# Patient Record
Sex: MALE | Race: WHITE | NOT HISPANIC OR LATINO | URBAN - METROPOLITAN AREA
[De-identification: names, ages, dates, MRNs, and addresses within clinical notes are randomized per-mention and may not be internally consistent; named-entity substitution may affect disease eponyms.]

---

## 2017-05-09 RX ADMIN — OXYCODONE HYDROCHLORIDE 10 MILLIGRAM(S): 5 TABLET ORAL at 11:51

## 2017-05-16 ENCOUNTER — INPATIENT (INPATIENT)
Facility: HOSPITAL | Age: 82
LOS: 2 days | Discharge: NURSING, REHAB OR LONG-TERM CARE | DRG: 181 | End: 2017-05-19
Attending: INTERNAL MEDICINE | Admitting: INTERNAL MEDICINE
Payer: MEDICARE

## 2017-05-16 VITALS
WEIGHT: 126.1 LBS | HEART RATE: 76 BPM | TEMPERATURE: 98 F | RESPIRATION RATE: 30 BRPM | OXYGEN SATURATION: 95 % | DIASTOLIC BLOOD PRESSURE: 88 MMHG | HEIGHT: 68 IN | SYSTOLIC BLOOD PRESSURE: 153 MMHG

## 2017-05-16 DIAGNOSIS — I35.0 NONRHEUMATIC AORTIC (VALVE) STENOSIS: ICD-10-CM

## 2017-05-16 DIAGNOSIS — J43.9 EMPHYSEMA, UNSPECIFIED: ICD-10-CM

## 2017-05-16 DIAGNOSIS — Z96.653 PRESENCE OF ARTIFICIAL KNEE JOINT, BILATERAL: Chronic | ICD-10-CM

## 2017-05-16 DIAGNOSIS — R64 CACHEXIA: ICD-10-CM

## 2017-05-16 DIAGNOSIS — R06.02 SHORTNESS OF BREATH: ICD-10-CM

## 2017-05-16 DIAGNOSIS — I25.10 ATHEROSCLEROTIC HEART DISEASE OF NATIVE CORONARY ARTERY WITHOUT ANGINA PECTORIS: ICD-10-CM

## 2017-05-16 DIAGNOSIS — T46.0X1A POISONING BY CARDIAC-STIMULANT GLYCOSIDES AND DRUGS OF SIMILAR ACTION, ACCIDENTAL (UNINTENTIONAL), INITIAL ENCOUNTER: ICD-10-CM

## 2017-05-16 DIAGNOSIS — Z95.0 PRESENCE OF CARDIAC PACEMAKER: ICD-10-CM

## 2017-05-16 DIAGNOSIS — I50.9 HEART FAILURE, UNSPECIFIED: ICD-10-CM

## 2017-05-16 DIAGNOSIS — D64.9 ANEMIA, UNSPECIFIED: ICD-10-CM

## 2017-05-16 DIAGNOSIS — I48.91 UNSPECIFIED ATRIAL FIBRILLATION: ICD-10-CM

## 2017-05-16 DIAGNOSIS — Z29.9 ENCOUNTER FOR PROPHYLACTIC MEASURES, UNSPECIFIED: ICD-10-CM

## 2017-05-16 DIAGNOSIS — I65.29 OCCLUSION AND STENOSIS OF UNSPECIFIED CAROTID ARTERY: ICD-10-CM

## 2017-05-16 DIAGNOSIS — R91.8 OTHER NONSPECIFIC ABNORMAL FINDING OF LUNG FIELD: ICD-10-CM

## 2017-05-16 LAB
ALBUMIN SERPL ELPH-MCNC: 2.9 G/DL — LOW (ref 3.3–5)
ALP SERPL-CCNC: 98 U/L — SIGNIFICANT CHANGE UP (ref 30–120)
ALT FLD-CCNC: 22 U/L DA — SIGNIFICANT CHANGE UP (ref 10–60)
ANION GAP SERPL CALC-SCNC: 11 MMOL/L — SIGNIFICANT CHANGE UP (ref 5–17)
AST SERPL-CCNC: 27 U/L — SIGNIFICANT CHANGE UP (ref 10–40)
BASOPHILS # BLD AUTO: 0.1 K/UL — SIGNIFICANT CHANGE UP (ref 0–0.2)
BASOPHILS NFR BLD AUTO: 0.5 % — SIGNIFICANT CHANGE UP (ref 0–2)
BILIRUB SERPL-MCNC: 0.5 MG/DL — SIGNIFICANT CHANGE UP (ref 0.2–1.2)
BUN SERPL-MCNC: 32 MG/DL — HIGH (ref 7–23)
CALCIUM SERPL-MCNC: 8.8 MG/DL — SIGNIFICANT CHANGE UP (ref 8.4–10.5)
CHLORIDE SERPL-SCNC: 104 MMOL/L — SIGNIFICANT CHANGE UP (ref 96–108)
CK MB BLD-MCNC: 2 % — SIGNIFICANT CHANGE UP (ref 0–3.5)
CK MB CFR SERPL CALC: 1.6 NG/ML — SIGNIFICANT CHANGE UP (ref 0–3.6)
CK SERPL-CCNC: 81 U/L — SIGNIFICANT CHANGE UP (ref 39–308)
CO2 SERPL-SCNC: 25 MMOL/L — SIGNIFICANT CHANGE UP (ref 22–31)
CREAT SERPL-MCNC: 0.83 MG/DL — SIGNIFICANT CHANGE UP (ref 0.5–1.3)
DIGOXIN SERPL-MCNC: 2.8 NG/ML — CRITICAL HIGH (ref 0.8–2)
EOSINOPHIL # BLD AUTO: 0.2 K/UL — SIGNIFICANT CHANGE UP (ref 0–0.5)
EOSINOPHIL NFR BLD AUTO: 1.5 % — SIGNIFICANT CHANGE UP (ref 0–6)
GLUCOSE SERPL-MCNC: 94 MG/DL — SIGNIFICANT CHANGE UP (ref 70–99)
HCT VFR BLD CALC: 33.8 % — LOW (ref 39–50)
HGB BLD-MCNC: 11.2 G/DL — LOW (ref 13–17)
LYMPHOCYTES # BLD AUTO: 0.8 K/UL — LOW (ref 1–3.3)
LYMPHOCYTES # BLD AUTO: 6.5 % — LOW (ref 13–44)
MCHC RBC-ENTMCNC: 30.2 PG — SIGNIFICANT CHANGE UP (ref 27–34)
MCHC RBC-ENTMCNC: 33.2 GM/DL — SIGNIFICANT CHANGE UP (ref 32–36)
MCV RBC AUTO: 90.9 FL — SIGNIFICANT CHANGE UP (ref 80–100)
MONOCYTES # BLD AUTO: 0.7 K/UL — SIGNIFICANT CHANGE UP (ref 0–0.9)
MONOCYTES NFR BLD AUTO: 6.1 % — SIGNIFICANT CHANGE UP (ref 2–14)
NEUTROPHILS # BLD AUTO: 10 K/UL — HIGH (ref 1.8–7.4)
NEUTROPHILS NFR BLD AUTO: 85.4 % — HIGH (ref 43–77)
NT-PROBNP SERPL-SCNC: 5197 PG/ML — HIGH (ref 0–450)
PLATELET # BLD AUTO: 265 K/UL — SIGNIFICANT CHANGE UP (ref 150–400)
POTASSIUM SERPL-MCNC: 3.7 MMOL/L — SIGNIFICANT CHANGE UP (ref 3.5–5.3)
POTASSIUM SERPL-SCNC: 3.7 MMOL/L — SIGNIFICANT CHANGE UP (ref 3.5–5.3)
PROT SERPL-MCNC: 6.8 G/DL — SIGNIFICANT CHANGE UP (ref 6–8.3)
RBC # BLD: 3.72 M/UL — LOW (ref 4.2–5.8)
RBC # FLD: 14 % — SIGNIFICANT CHANGE UP (ref 10.3–14.5)
SODIUM SERPL-SCNC: 140 MMOL/L — SIGNIFICANT CHANGE UP (ref 135–145)
TROPONIN I SERPL-MCNC: 0.07 NG/ML — HIGH (ref 0.02–0.06)
TROPONIN I SERPL-MCNC: 0.07 NG/ML — HIGH (ref 0.02–0.06)
WBC # BLD: 11.7 K/UL — HIGH (ref 3.8–10.5)
WBC # FLD AUTO: 11.7 K/UL — HIGH (ref 3.8–10.5)

## 2017-05-16 PROCEDURE — 93010 ELECTROCARDIOGRAM REPORT: CPT

## 2017-05-16 PROCEDURE — 71010: CPT | Mod: 26

## 2017-05-16 PROCEDURE — 93306 TTE W/DOPPLER COMPLETE: CPT | Mod: 26

## 2017-05-16 PROCEDURE — 99223 1ST HOSP IP/OBS HIGH 75: CPT | Mod: AI

## 2017-05-16 PROCEDURE — 99285 EMERGENCY DEPT VISIT HI MDM: CPT

## 2017-05-16 PROCEDURE — 99223 1ST HOSP IP/OBS HIGH 75: CPT | Mod: 25

## 2017-05-16 PROCEDURE — 71250 CT THORAX DX C-: CPT | Mod: 26

## 2017-05-16 RX ORDER — SIMVASTATIN 20 MG/1
40 TABLET, FILM COATED ORAL AT BEDTIME
Qty: 0 | Refills: 0 | Status: DISCONTINUED | OUTPATIENT
Start: 2017-05-16 | End: 2017-05-19

## 2017-05-16 RX ORDER — DOXAZOSIN MESYLATE 4 MG
4 TABLET ORAL AT BEDTIME
Qty: 0 | Refills: 0 | Status: DISCONTINUED | OUTPATIENT
Start: 2017-05-16 | End: 2017-05-19

## 2017-05-16 RX ORDER — IPRATROPIUM/ALBUTEROL SULFATE 18-103MCG
3 AEROSOL WITH ADAPTER (GRAM) INHALATION EVERY 6 HOURS
Qty: 0 | Refills: 0 | Status: DISCONTINUED | OUTPATIENT
Start: 2017-05-16 | End: 2017-05-16

## 2017-05-16 RX ORDER — SODIUM CHLORIDE 9 MG/ML
3 INJECTION INTRAMUSCULAR; INTRAVENOUS; SUBCUTANEOUS ONCE
Qty: 0 | Refills: 0 | Status: COMPLETED | OUTPATIENT
Start: 2017-05-16 | End: 2017-05-16

## 2017-05-16 RX ORDER — OXYCODONE HYDROCHLORIDE 5 MG/1
5 TABLET ORAL EVERY 4 HOURS
Qty: 0 | Refills: 0 | Status: DISCONTINUED | OUTPATIENT
Start: 2017-05-16 | End: 2017-05-19

## 2017-05-16 RX ORDER — OXYCODONE HYDROCHLORIDE 5 MG/1
10 TABLET ORAL EVERY 4 HOURS
Qty: 0 | Refills: 0 | Status: DISCONTINUED | OUTPATIENT
Start: 2017-05-16 | End: 2017-05-19

## 2017-05-16 RX ORDER — FERROUS SULFATE 325(65) MG
325 TABLET ORAL DAILY
Qty: 0 | Refills: 0 | Status: DISCONTINUED | OUTPATIENT
Start: 2017-05-16 | End: 2017-05-19

## 2017-05-16 RX ORDER — IPRATROPIUM/ALBUTEROL SULFATE 18-103MCG
3 AEROSOL WITH ADAPTER (GRAM) INHALATION EVERY 6 HOURS
Qty: 0 | Refills: 0 | Status: DISCONTINUED | OUTPATIENT
Start: 2017-05-16 | End: 2017-05-19

## 2017-05-16 RX ORDER — FUROSEMIDE 40 MG
20 TABLET ORAL ONCE
Qty: 0 | Refills: 0 | Status: COMPLETED | OUTPATIENT
Start: 2017-05-16 | End: 2017-05-16

## 2017-05-16 RX ORDER — METOPROLOL TARTRATE 50 MG
25 TABLET ORAL
Qty: 0 | Refills: 0 | Status: DISCONTINUED | OUTPATIENT
Start: 2017-05-16 | End: 2017-05-19

## 2017-05-16 RX ORDER — ASPIRIN/CALCIUM CARB/MAGNESIUM 324 MG
81 TABLET ORAL DAILY
Qty: 0 | Refills: 0 | Status: DISCONTINUED | OUTPATIENT
Start: 2017-05-16 | End: 2017-05-19

## 2017-05-16 RX ORDER — HEPARIN SODIUM 5000 [USP'U]/ML
5000 INJECTION INTRAVENOUS; SUBCUTANEOUS EVERY 12 HOURS
Qty: 0 | Refills: 0 | Status: DISCONTINUED | OUTPATIENT
Start: 2017-05-16 | End: 2017-05-19

## 2017-05-16 RX ORDER — DILTIAZEM HCL 120 MG
1 CAPSULE, EXT RELEASE 24 HR ORAL
Qty: 0 | Refills: 0 | COMMUNITY

## 2017-05-16 RX ORDER — DILTIAZEM HCL 120 MG
180 CAPSULE, EXT RELEASE 24 HR ORAL DAILY
Qty: 0 | Refills: 0 | Status: DISCONTINUED | OUTPATIENT
Start: 2017-05-16 | End: 2017-05-19

## 2017-05-16 RX ORDER — BUDESONIDE, MICRONIZED 100 %
0.5 POWDER (GRAM) MISCELLANEOUS
Qty: 0 | Refills: 0 | Status: DISCONTINUED | OUTPATIENT
Start: 2017-05-16 | End: 2017-05-19

## 2017-05-16 RX ORDER — OXYCODONE HYDROCHLORIDE 5 MG/1
40 TABLET ORAL EVERY 8 HOURS
Qty: 0 | Refills: 0 | Status: DISCONTINUED | OUTPATIENT
Start: 2017-05-16 | End: 2017-05-19

## 2017-05-16 RX ADMIN — SIMVASTATIN 40 MILLIGRAM(S): 20 TABLET, FILM COATED ORAL at 23:09

## 2017-05-16 RX ADMIN — OXYCODONE HYDROCHLORIDE 10 MILLIGRAM(S): 5 TABLET ORAL at 18:10

## 2017-05-16 RX ADMIN — Medication 4 MILLIGRAM(S): at 23:09

## 2017-05-16 RX ADMIN — SODIUM CHLORIDE 3 MILLILITER(S): 9 INJECTION INTRAMUSCULAR; INTRAVENOUS; SUBCUTANEOUS at 12:30

## 2017-05-16 RX ADMIN — Medication 3 MILLILITER(S): at 13:24

## 2017-05-16 RX ADMIN — Medication 40 MILLIGRAM(S): at 23:09

## 2017-05-16 RX ADMIN — HEPARIN SODIUM 5000 UNIT(S): 5000 INJECTION INTRAVENOUS; SUBCUTANEOUS at 18:10

## 2017-05-16 RX ADMIN — Medication 20 MILLIGRAM(S): at 14:48

## 2017-05-16 RX ADMIN — Medication 0.5 MILLIGRAM(S): at 19:36

## 2017-05-16 RX ADMIN — Medication 25 MILLIGRAM(S): at 18:10

## 2017-05-16 RX ADMIN — OXYCODONE HYDROCHLORIDE 40 MILLIGRAM(S): 5 TABLET ORAL at 22:00

## 2017-05-16 RX ADMIN — OXYCODONE HYDROCHLORIDE 40 MILLIGRAM(S): 5 TABLET ORAL at 23:09

## 2017-05-16 RX ADMIN — Medication 3 MILLILITER(S): at 19:36

## 2017-05-16 NOTE — CONSULT NOTE ADULT - PROBLEM SELECTOR RECOMMENDATION 2
Aortic stenosis was mild in 2014 - loud murmur on examination; repeat echo to assess for interval progression.

## 2017-05-16 NOTE — H&P ADULT - NSHPPHYSICALEXAM_GEN_ALL_CORE
PHYSICAL EXAM:  Vital Signs Last 24 Hrs  T(C): 36.6, Max: 36.6 (05-16 @ 12:06)  T(F): 97.8, Max: 97.8 (05-16 @ 12:06)  HR: 73 (60 - 76)  BP: 148/76 (138/82 - 155/66)  BP(mean): --  RR: 23 (22 - 30)  SpO2: 98% (95% - 98%)    GENERAL: NAD, cachectic   HEAD:  Atraumatic, Normocephalic  EYES: conjunctiva and sclera clear  ENMT: No tonsillar erythema, exudates, or enlargement; Moist mucous membranes  NECK: Supple  NERVOUS SYSTEM:  Alert & Oriented X2, Good atttention; moving all 4 extremities - able to get back into bed fromt he chair but reports feeling weak, no gross sensory loss;   CHEST/LUNG: Clear to auscultation bilaterally; decreased air entry no overt wheezing  HEART: Regular rate and rhythm;   ABDOMEN: Soft, Nontender, Nondistended; Bowel sounds present  EXTREMITIES:  2+ Peripheral Pulses, No clubbing, cyanosis, or edema  SKIN: multiple psoriatic lesions

## 2017-05-16 NOTE — CONSULT NOTE ADULT - PROBLEM SELECTOR PROBLEM 4
Digoxin overdose, accidental or unintentional, initial encounter
COPD (chronic obstructive pulmonary disease) with emphysema

## 2017-05-16 NOTE — H&P ADULT - PROBLEM SELECTOR PLAN 3
Cardiology eval appreciated  Echo done - awaiting official read  Lasix given by ED - will re-evaluate need for diuresis in AM  PPM eval tomorrow.   Med management per cardiology

## 2017-05-16 NOTE — H&P ADULT - ASSESSMENT
86M with multiple chronic medical issues presents with worsening dyspnea, no clear alleviating or aggravating factors other than worse with ambulation.  BNP elevated but unclear if symptoms are due to CHF.  Symptoms may be due to untreated COPD or lung mass found on CT.

## 2017-05-16 NOTE — H&P ADULT - PROBLEM SELECTOR PLAN 2
SOB may be due to acute COPD exacerbation   Solumedrol ordered  inhalers ordered - pulmicort/ duoneb  Pulmonary eval - Dr Cleary

## 2017-05-16 NOTE — H&P ADULT - NSHPLABSRESULTS_GEN_ALL_CORE
11.2   11.7  )-----------( 265      ( 16 May 2017 12:46 )             33.8       05-16    140  |  104  |  32<H>  ----------------------------<  94  3.7   |  25  |  0.83    Ca    8.8      16 May 2017 12:46    TPro  6.8  /  Alb  2.9<L>  /  TBili  0.5  /  DBili  x   /  AST  27  /  ALT  22  /  AlkPhos  98  05-16          CARDIAC MARKERS ( 16 May 2017 12:46 )  .065 ng/mL / x     / 81 U/L / x     / 1.6 ng/mL    Serum Pro-Brain Natriuretic Peptide (05.16.17 @ 12:46)      Serum Pro-Brain Natriuretic Peptide: 5197 pg/mL    Digoxin Level, Serum: 2.8:    Xray:    A soft tissue mass is present in the left upper lung measuring 6 cm   maximum span. Findings suspicious for the presence of a neoplasm. The   right hemidiaphragm is flattened, an emphysematous change. No effusion.   No vascular congestion.     CT chest:    mass is present in the left upper lobe measuring 65 mm vertical height,   71 mm in depth, 47 mm in width. This mass extends to the pleural surface   and to the left suprahilar region. The appearance is consistent with the   presence of a  neoplasm. There are multiple nodes in the mediastinum   which although are in the subcentimeter  size range must be considered   suspicious for the presence of metastatic disease.    Small bibasilar pleural effusions are present. Small pericardial effusion   is present. No evidence of thoracic aortic aneurysm.    A diffuse groundglass appearance is identified in the right lung.   Scattered groundglass appearance also present in the left lung.    The central airway appears intact. Thyroid gland is not enlarged. A   cardiac pacemaker is present with generator localized to the soft tissues   of the left anterior upper thorax.    Prominent appearance of adrenal glands without focal mass. No acute   osseous abnormalities are identified.

## 2017-05-16 NOTE — ED ADULT NURSE NOTE - CHPI ED SYMPTOMS POS
SHORTNESS OF BREATH/DIFFICULTY BREATHING DYSPNEA ON EXERTION/SHORTNESS OF BREATH/DIFFICULTY BREATHING

## 2017-05-16 NOTE — CONSULT NOTE ADULT - PROBLEM SELECTOR RECOMMENDATION 9
Elevated proBNP but no signs of pulmonary edema / volume overload; I suspect SOB is related to COPD, possible progression of known valve disease, lung mass.  I recommend pulmonary consultation - he has seen Dr. Cleary in the past.

## 2017-05-16 NOTE — ED PROVIDER NOTE - CARE PLAN
Principal Discharge DX:	COPD (chronic obstructive pulmonary disease) with emphysema  Secondary Diagnosis:	CHF (congestive heart failure)

## 2017-05-16 NOTE — CONSULT NOTE ADULT - PROBLEM SELECTOR RECOMMENDATION 4
Elevated serum digoxin level on presentation; hold digoxin; no signs of toxicity.
Inhalers  Steroids

## 2017-05-16 NOTE — ED ADULT NURSE NOTE - CHPI ED SYMPTOMS NEG
no edema/no hemoptysis/no cough/no chills/no headache/no diaphoresis/no chest pain/no fever no edema/no wheezing/no body aches/no chills/no chest pain/no diaphoresis/no hemoptysis/no fever/no headache/no cough

## 2017-05-16 NOTE — ED ADULT NURSE NOTE - PMH
Anemia    Asthma, Chronic    Atrial Fibrillation    Bacterial Endocarditis    BPH (Benign Prostatic Hypertrophy)    CAD (coronary artery disease)    Cardiac Pacemaker    Carotid stenosis    COPD (Chronic Obstructive Pulmonary Disease)    Diverticulitis    GIB (gastrointestinal bleeding)    HTN (Hypertension)    Polymicrobial Sepsis    Renal Failure, Chronic

## 2017-05-16 NOTE — CONSULT NOTE ADULT - ASSESSMENT
Elderly noncompliant pt. with MARILIA mass--most likely metastatic lung cancer.  COPD with recent exacerbation.

## 2017-05-16 NOTE — ED ADULT NURSE NOTE - OBJECTIVE STATEMENT
Pt states sob for past 2-3 nights.  Has appt this friday with LUCILLE GUZMAN. Pt states sob for past 2-3 nights.  [facility]. Pt c/o worsening SOB and weakness with exertion for 3 days. Does not use O2 at home; state some improvement with O2 at this time.  Denies fevers, chest pain, cough, abdominal pain, nausea, or vomiting.

## 2017-05-16 NOTE — H&P ADULT - NSHPREVIEWOFSYSTEMS_GEN_ALL_CORE
REVIEW OF SYSTEMS:  CONSTITUTIONAL: + weight loss, somewhat intentional.   EYES: No eye pain, visual disturbances  ENMT:  No sinus or throat pain  BREASTS: No pain, masses, or nipple discharge  RESPIRATORY: see hpi  CARDIOVASCULAR: No chest pain, palpitations, dizziness, or leg swelling  GASTROINTESTINAL: No abdominal or epigastric pain. No nausea, vomiting, or hematemesis; No diarrhea or constipation. No melena or hematochezia.  GENITOURINARY: + chronic urinary frequency.  His kinesiologist does exercises with him to "raise his bladder" that have been helping.   NEUROLOGICAL: + worsening weakness and difficulty ambulating.   SKIN: +psoriatic lesions.  ENDOCRINE: daughter reports frequent urination at home  MUSCULOSKELETAL: +chronic knee and back pain.   PSYCHIATRIC: forgetful at times.

## 2017-05-16 NOTE — H&P ADULT - PROBLEM SELECTOR PLAN 6
likely due to dietary changes.  carbohydrate malnutrition.  may also be secondary to malignancy.  d/w daughter that he needs carbs in his diet even if not bread.  encourage po intake, add ensure,

## 2017-05-16 NOTE — ED ADULT NURSE NOTE - PRIMARY CARE PROVIDER
[facility]  and MD reid [place] [facility]  and MD across [place] from [facility] Dr. Beckford

## 2017-05-16 NOTE — CONSULT NOTE ADULT - PROBLEM SELECTOR PROBLEM 3
Coronary artery disease involving native coronary artery of native heart without angina pectoris
Coronary artery disease involving native coronary artery of native heart without angina pectoris

## 2017-05-16 NOTE — ED PROVIDER NOTE - DETAILS:
Brenton Phipps MD - The scribe's documentation has been prepared under my direction and personally reviewed by me in its entirety. I confirm that the note above accurately reflects all work, treatment, procedures, and medical decision making performed by me.

## 2017-05-16 NOTE — ED ADULT NURSE REASSESSMENT NOTE - NS ED NURSE REASSESS COMMENT FT1
phoned ekg to do ekg, phoned resp for duo neb treatment
Dr. Somers placing orders, daughter at bedside.

## 2017-05-16 NOTE — H&P ADULT - PROBLEM SELECTOR PLAN 5
continue aspirin   med management per cardiology continue aspirin   med management per cardiology    Rate controlled afib  Dig level elevated - will hold and repeat level in am

## 2017-05-16 NOTE — CONSULT NOTE ADULT - SUBJECTIVE AND OBJECTIVE BOX
REQUESTING PHYSICIAN: Dr. AMELIE Beard    REASON FOR CONSULT: Asked to see patient for SOB     CHIEF COMPLAINT: SOB x 2 days    HPI:  86 year old man with a history of CAD s/p PCI (LAD 2012, RCA 2010), atrial fibrillation, PPM, COPD, GI bleed (recurrent), carotid artery disease, HTN, HLD, aortic stenosis (mild in 2014), who presents to the ED with complaint of dyspnea.  Symptoms started 2 days ago - some worsening over past 48 hours prompted him to come to the ER.  He is unable to identify exacerbating or alleviating factors; no associated orthopnea, cough, hemoptysis sputum production.  He has has little traditional medical care in several years; last visit with his cardiologist (Dr. Palla) was in 2014; seeking alternative medicine.    PAST MEDICAL & SURGICAL HISTORY:  Carotid stenosis  GIB (gastrointestinal bleeding)  CAD (coronary artery disease)  Diverticulitis  Anemia  Polymicrobial Sepsis  Bacterial Endocarditis  Cardiac Pacemaker  COPD (Chronic Obstructive Pulmonary Disease)  Atrial Fibrillation  BPH (Benign Prostatic Hypertrophy)  Asthma, Chronic  HTN (Hypertension)  Renal Failure, Chronic  Renal Failure, Chronic  Pacemaker  CAD (Coronary Artery Disease)    SOCIAL HISTORY:   Alcohol: Denied  Smoking: Nonsmoker    FAMILY HISTORY:   No pertinent family history    MEDICATIONS:  * Patient unable to recall current meds.  In May 2014, he was prescribed digoxin, diltiazem, flomax, singulair, zocor    Allergies:   clindamycin (Unknown)  GoLYTELY (Unknown)  Levaquin (Pruritus; Rash)  P O Contrast (Unknown)  penicillin (Rash)      REVIEW OF SYSTEMS:  CONSTITUTIONAL: + weakness (especially legs with ambulation), no fevers or chills  EYES/ENT: No visual changes;  No vertigo or throat pain   NECK: No pain or stiffness  RESPIRATORY: No cough, wheezing, hemoptysis; + shortness of breath  CARDIOVASCULAR: No chest pain or palpitations  GASTROINTESTINAL: No abdominal pain. No nausea, vomiting, or hematemesis; No diarrhea or constipation. No melena or hematochezia.  GENITOURINARY: No dysuria, frequency or hematuria  NEUROLOGICAL: No numbness or weakness  SKIN: No itching or rash  All other review of systems is negative unless indicated above    VITAL SIGNS:   Vital Signs Last 24 Hrs  T(C): 36.6, Max: 36.6 (05-16 @ 12:06)  T(F): 97.8, Max: 97.8 (05-16 @ 12:06)  HR: 61 (60 - 76)  BP: 138/82 (138/82 - 155/66)  RR: 22 (22 - 30)  SpO2: 95% (95% - 97%)    PHYSICAL EXAM:  Constitutional: Appears stated age, supine/flat in bed and in no distress  Eyes:  EOMI,  Pupils round, No oral cyanosis.  Pulmonary: Non-labored, breath sounds are clear bilaterally, No wheezing, rales or rhonchi  Cardiovascular: S1 and S2, regular rate and rhythm, III/VI systolic murmur at base radiating to carotid  Gastrointestinal: Bowel Sounds present, soft, nontender.   Lymph: No peripheral edema. No cervical lymphadenopathy.  Neurological: Alert, no focal deficits  Skin: Warm, dry.  Psych:  Appears somewhat withdrawn; otherwise mood & affect appropriate    LABS:                        11.2   11.7  )-----------( 265      ( 16 May 2017 12:46 )             33.8     140    |  104    |  32     ----------------------------<  94     3.7     |  25     |  0.83     Ca    8.8        16 May 2017 12:46  TPro  6.8    /  Alb  2.9    /  TBili  0.5    /  DBili  x      /  AST  27     /  ALT  22     /  AlkPhos  98     16 May 2017 12:46    CARDIAC MARKERS ( 16 May 2017 12:46 ) .065 ng/mL / x     / 81 U/L / x     / 1.6 ng/mL    05-16 @ 12:46 Pro Bnp [id number]    CXR: A soft tissue mass is present in the left upper lung measuring 6 cm maximum span. Findings suspicious for the presence of a neoplasm. The right hemidiaphragm is flattened, an emphysematous change. No effusion. No vascular congestion.     ECG: AF, PVC, electronic ventricular pacing

## 2017-05-16 NOTE — H&P ADULT - HISTORY OF PRESENT ILLNESS
86 years old male with history CAD, Cardiomyopathy, A Fib, Anemia, pneumonia, CKD, Diverticulosis, GI bleed, BPH, COPD, chronic pain on opioids, presents with 2 day history of SOB.  Daughter reports he is mostly sedentary due to chronic knee pain despite bilateral knee replacements and chronic opioid use.  He reports feeling SOB at rest and increasing with movement but denies cough, fever, chest pain, palpitations. 86 years old male with history CAD, Cardiomyopathy, A Fib, Anemia, pneumonia, CKD, Diverticulosis, GI bleed, BPH, COPD, chronic pain on opioids, presents with 2 day history of SOB.  Daughter reports he is mostly sedentary due to chronic knee pain despite bilateral knee replacements and chronic opioid use.  He reports feeling SOB at rest and increasing with movement but denies cough, fever, chills, chest pain, palpitations.  Aside from weak knees and chronic back pain he denies other symptoms.  He denies changes in medication or diet.  His kinesiologist has him on a low carb, no yeast diet and he has lost >100 lbs on this diet.  The patient and daughter both say that he has been eating his proteins and vegetables.  Patient and daughter [facility] but hasn't seen a cardiologist there.  Last check of his pacemaker was 3 years ago in Dr Palla's office.  He also stopped seeing Dr Cleary since "he wasn't having any breathing issues".  The daughter remembers being told he needs a PET scan, but she thought it was for something in his carotid and did not want him getting any invasive dyes since that might be bad for him.  She reports patient did not smoke but that other people smoke in the house but not in his room.  Both parents  of old age in their 90s.

## 2017-05-16 NOTE — CONSULT NOTE ADULT - PROBLEM SELECTOR RECOMMENDATION 3
CAD s/p remote PCI of LAD and RCA; symptoms not suggestive of typical angina but may need ischemia evaluation depending on hospital course.

## 2017-05-16 NOTE — H&P ADULT - ATTENDING COMMENTS
imaging personally reviewed, reports d/w dr barrera and dr reina  d/w Dr mcnulty need for echo, ppm interogation  d/w daughter need for biopsy and possibility of malignancy

## 2017-05-16 NOTE — PROVIDER CONTACT NOTE (CRITICAL VALUE NOTIFICATION) - ACTION/TREATMENT ORDERED:
MD aware, no further orders at this time.
1906: Dr. Hilliard aware and states to cancel any future Troponin orders.

## 2017-05-17 ENCOUNTER — RESULT REVIEW (OUTPATIENT)
Age: 82
End: 2017-05-17

## 2017-05-17 DIAGNOSIS — I05.9 RHEUMATIC MITRAL VALVE DISEASE, UNSPECIFIED: ICD-10-CM

## 2017-05-17 DIAGNOSIS — I35.9 NONRHEUMATIC AORTIC VALVE DISORDER, UNSPECIFIED: ICD-10-CM

## 2017-05-17 DIAGNOSIS — I27.2 OTHER SECONDARY PULMONARY HYPERTENSION: ICD-10-CM

## 2017-05-17 LAB
ANION GAP SERPL CALC-SCNC: 9 MMOL/L — SIGNIFICANT CHANGE UP (ref 5–17)
BASOPHILS # BLD AUTO: 0 K/UL — SIGNIFICANT CHANGE UP (ref 0–0.2)
BASOPHILS NFR BLD AUTO: 0.3 % — SIGNIFICANT CHANGE UP (ref 0–2)
BUN SERPL-MCNC: 32 MG/DL — HIGH (ref 7–23)
CALCIUM SERPL-MCNC: 8.8 MG/DL — SIGNIFICANT CHANGE UP (ref 8.4–10.5)
CHLORIDE SERPL-SCNC: 104 MMOL/L — SIGNIFICANT CHANGE UP (ref 96–108)
CO2 SERPL-SCNC: 28 MMOL/L — SIGNIFICANT CHANGE UP (ref 22–31)
CREAT SERPL-MCNC: 0.86 MG/DL — SIGNIFICANT CHANGE UP (ref 0.5–1.3)
DIGOXIN SERPL-MCNC: 2.3 NG/ML — HIGH (ref 0.8–2)
EOSINOPHIL # BLD AUTO: 0 K/UL — SIGNIFICANT CHANGE UP (ref 0–0.5)
EOSINOPHIL NFR BLD AUTO: 0.3 % — SIGNIFICANT CHANGE UP (ref 0–6)
GLUCOSE SERPL-MCNC: 122 MG/DL — HIGH (ref 70–99)
HCT VFR BLD CALC: 32.6 % — LOW (ref 39–50)
HGB BLD-MCNC: 11.1 G/DL — LOW (ref 13–17)
INR BLD: 1.05 RATIO — SIGNIFICANT CHANGE UP (ref 0.88–1.16)
LYMPHOCYTES # BLD AUTO: 0.5 K/UL — LOW (ref 1–3.3)
LYMPHOCYTES # BLD AUTO: 6.8 % — LOW (ref 13–44)
MCHC RBC-ENTMCNC: 30.4 PG — SIGNIFICANT CHANGE UP (ref 27–34)
MCHC RBC-ENTMCNC: 34.1 GM/DL — SIGNIFICANT CHANGE UP (ref 32–36)
MCV RBC AUTO: 89 FL — SIGNIFICANT CHANGE UP (ref 80–100)
MONOCYTES # BLD AUTO: 0.1 K/UL — SIGNIFICANT CHANGE UP (ref 0–0.9)
MONOCYTES NFR BLD AUTO: 1.6 % — LOW (ref 2–14)
NEUTROPHILS # BLD AUTO: 6.5 K/UL — SIGNIFICANT CHANGE UP (ref 1.8–7.4)
NEUTROPHILS NFR BLD AUTO: 91 % — HIGH (ref 43–77)
PLATELET # BLD AUTO: 252 K/UL — SIGNIFICANT CHANGE UP (ref 150–400)
POTASSIUM SERPL-MCNC: 3.6 MMOL/L — SIGNIFICANT CHANGE UP (ref 3.5–5.3)
POTASSIUM SERPL-SCNC: 3.6 MMOL/L — SIGNIFICANT CHANGE UP (ref 3.5–5.3)
PROTHROM AB SERPL-ACNC: 11.5 SEC — SIGNIFICANT CHANGE UP (ref 9.8–12.7)
RBC # BLD: 3.67 M/UL — LOW (ref 4.2–5.8)
RBC # FLD: 14.4 % — SIGNIFICANT CHANGE UP (ref 10.3–14.5)
SODIUM SERPL-SCNC: 141 MMOL/L — SIGNIFICANT CHANGE UP (ref 135–145)
TROPONIN I SERPL-MCNC: 0.04 NG/ML — SIGNIFICANT CHANGE UP (ref 0.02–0.06)
WBC # BLD: 7.1 K/UL — SIGNIFICANT CHANGE UP (ref 3.8–10.5)
WBC # FLD AUTO: 7.1 K/UL — SIGNIFICANT CHANGE UP (ref 3.8–10.5)

## 2017-05-17 PROCEDURE — 71010: CPT | Mod: 26

## 2017-05-17 PROCEDURE — 71010: CPT | Mod: 26,77

## 2017-05-17 PROCEDURE — 32405: CPT

## 2017-05-17 PROCEDURE — 88305 TISSUE EXAM BY PATHOLOGIST: CPT | Mod: 26

## 2017-05-17 PROCEDURE — 99233 SBSQ HOSP IP/OBS HIGH 50: CPT

## 2017-05-17 PROCEDURE — 77012 CT SCAN FOR NEEDLE BIOPSY: CPT | Mod: 26

## 2017-05-17 RX ORDER — ALPRAZOLAM 0.25 MG
0.25 TABLET ORAL ONCE
Qty: 0 | Refills: 0 | Status: DISCONTINUED | OUTPATIENT
Start: 2017-05-17 | End: 2017-05-17

## 2017-05-17 RX ORDER — FUROSEMIDE 40 MG
20 TABLET ORAL DAILY
Qty: 0 | Refills: 0 | Status: DISCONTINUED | OUTPATIENT
Start: 2017-05-17 | End: 2017-05-19

## 2017-05-17 RX ADMIN — OXYCODONE HYDROCHLORIDE 40 MILLIGRAM(S): 5 TABLET ORAL at 18:00

## 2017-05-17 RX ADMIN — Medication 25 MILLIGRAM(S): at 17:01

## 2017-05-17 RX ADMIN — OXYCODONE HYDROCHLORIDE 40 MILLIGRAM(S): 5 TABLET ORAL at 17:01

## 2017-05-17 RX ADMIN — HEPARIN SODIUM 5000 UNIT(S): 5000 INJECTION INTRAVENOUS; SUBCUTANEOUS at 17:00

## 2017-05-17 RX ADMIN — Medication 325 MILLIGRAM(S): at 11:35

## 2017-05-17 RX ADMIN — Medication 20 MILLIGRAM(S): at 11:35

## 2017-05-17 RX ADMIN — Medication 0.25 MILLIGRAM(S): at 12:24

## 2017-05-17 RX ADMIN — Medication 1 APPLICATION(S): at 05:28

## 2017-05-17 RX ADMIN — Medication 81 MILLIGRAM(S): at 11:35

## 2017-05-17 RX ADMIN — Medication 3 MILLILITER(S): at 12:49

## 2017-05-17 RX ADMIN — Medication 40 MILLIGRAM(S): at 16:59

## 2017-05-17 RX ADMIN — HEPARIN SODIUM 5000 UNIT(S): 5000 INJECTION INTRAVENOUS; SUBCUTANEOUS at 05:27

## 2017-05-17 RX ADMIN — Medication 3 MILLILITER(S): at 07:15

## 2017-05-17 RX ADMIN — Medication 40 MILLIGRAM(S): at 05:28

## 2017-05-17 RX ADMIN — OXYCODONE HYDROCHLORIDE 40 MILLIGRAM(S): 5 TABLET ORAL at 22:39

## 2017-05-17 RX ADMIN — OXYCODONE HYDROCHLORIDE 40 MILLIGRAM(S): 5 TABLET ORAL at 05:27

## 2017-05-17 RX ADMIN — OXYCODONE HYDROCHLORIDE 40 MILLIGRAM(S): 5 TABLET ORAL at 21:38

## 2017-05-17 RX ADMIN — Medication 4 MILLIGRAM(S): at 21:39

## 2017-05-17 RX ADMIN — Medication 0.5 MILLIGRAM(S): at 19:58

## 2017-05-17 RX ADMIN — Medication 25 MILLIGRAM(S): at 05:28

## 2017-05-17 RX ADMIN — Medication 3 MILLILITER(S): at 19:58

## 2017-05-17 RX ADMIN — SIMVASTATIN 40 MILLIGRAM(S): 20 TABLET, FILM COATED ORAL at 21:38

## 2017-05-17 RX ADMIN — Medication 180 MILLIGRAM(S): at 05:27

## 2017-05-17 RX ADMIN — Medication 1 APPLICATION(S): at 17:00

## 2017-05-17 RX ADMIN — Medication 40 MILLIGRAM(S): at 21:38

## 2017-05-17 RX ADMIN — Medication 0.5 MILLIGRAM(S): at 07:15

## 2017-05-17 NOTE — DIETITIAN INITIAL EVALUATION ADULT. - ETIOLOGY
physiological causes increasing nutrient needs desire for a cure for chronic disease through use of alternative therapy

## 2017-05-17 NOTE — DIETITIAN INITIAL EVALUATION ADULT. - OTHER INFO
Afib,CAD, chronic pain, pna, CKD, diverticulitis, anemia, COPD  Pt last saw pulmonary MD in 2014 when MARILIA nodule was discovered but pt refused PET scan. Pt has been seeing only  a kinesiologist since. He states he was told not to eat carbohydrates or yeast but could not recall reason why-just that kinesiologist told him not to. Pt reports 100 # wt loss over a year that was unintentional  Pt reports eating a balanced diet currently in hospital. RN confirms this. He denies any trouble chewing/swallowing and/or GI issues. Educated pt on risks of low to no carbohydrate diet. Pt verbalizes understanding. Per MD note pt likely has lung cancer with mets which would account for the weight loss. biopsy is pending. Noted pt admitted with stage II pressure ulcer to [facility] and b/l heel pressure ulcers stage I. Pt diet rx low sodium with ensure TID.

## 2017-05-17 NOTE — PHYSICAL THERAPY INITIAL EVALUATION ADULT - ADDITIONAL COMMENTS
Pt lives with his daughter with 7 SHU with 2 RHs. Inside the house, pt has 7 stairs up and 7 stairs down with HRs. Pt reports having owning a cane and rolling walker and using both interchangeably.

## 2017-05-17 NOTE — PROGRESS NOTE ADULT - PROBLEM SELECTOR PLAN 3
Cardiology eval appreciated  Echo done - chronic diastolic HF, ?If symptoms due to acute component.   Lasix as needed.    Med management per cardiology

## 2017-05-17 NOTE — PROGRESS NOTE ADULT - SUBJECTIVE AND OBJECTIVE BOX
REQUESTING PHYSICIAN: Dr. AMELIE Rogel    REASON FOR CONSULT: Asked to see patient for SOB     CHIEF COMPLAINT: SOB x 2 days    HPI:  86 year old man with a history of CAD s/p PCI (LAD 2012, RCA 2010), atrial fibrillation, PPM, COPD, GI bleed (recurrent), carotid artery disease, HTN, HLD, aortic stenosis (mild in 2014), who presents to the ED with complaint of dyspnea.  Symptoms started 2 days ago - some worsening over past 48 hours prompted him to come to the ER.  He is unable to identify exacerbating or alleviating factors; no associated orthopnea, cough, hemoptysis sputum production.  He has has little traditional medical care in several years; last visit with his cardiologist (Dr. Palla) was in 2014; seeking alternative medicine.    5/17/17: Still SOB but slightly improved.  Lung mass noted on imaging and thought to be metastatic lung ca by pulmonatry and BX. being planned.   Denies chest pain.       PAST MEDICAL & SURGICAL HISTORY:  Carotid stenosis  GIB (gastrointestinal bleeding)  CAD (coronary artery disease)  Diverticulitis  Anemia  Polymicrobial Sepsis  Bacterial Endocarditis  Cardiac Pacemaker  COPD (Chronic Obstructive Pulmonary Disease)  Atrial Fibrillation  BPH (Benign Prostatic Hypertrophy)  Asthma, Chronic  HTN (Hypertension)  Renal Failure, Chronic  Renal Failure, Chronic  Pacemaker  CAD (Coronary Artery Disease)  Valvular heart disease  Pulmonary htn.    SOCIAL HISTORY:   Alcohol: Denied  Smoking: Nonsmoker    FAMILY HISTORY:   No pertinent family history    Home MEDICATIONS:  * Patient unable to recall current meds.  In May 2014, he was prescribed digoxin, diltiazem, flomax, singulair, zocor    Allergies:   clindamycin (Unknown)  GoLYTELY (Unknown)  Levaquin (Pruritus; Rash)  P O Contrast (Unknown)  penicillin (Rash)      REVIEW OF SYSTEMS:  CONSTITUTIONAL: + weakness (especially legs with ambulation), no fevers or chills  EYES/ENT: No visual changes;  No vertigo or throat pain   NECK: No pain or stiffness  RESPIRATORY: No cough, wheezing, hemoptysis; + shortness of breath  CARDIOVASCULAR: No chest pain or palpitations  GASTROINTESTINAL: No abdominal pain. No nausea, vomiting, or hematemesis; No diarrhea or constipation. No melena or hematochezia.  GENITOURINARY: No dysuria, frequency or hematuria  NEUROLOGICAL: No numbness or weakness  SKIN: No itching or rash  All other review of systems is negative unless indicated above    MEDICATIONS  (STANDING):  ALBUTerol/ipratropium for Nebulization 3milliLiter(s) Nebulizer every 6 hours  methylPREDNISolone sodium succinate Injectable 40milliGRAM(s) IV Push every 8 hours  heparin  Injectable 5000Unit(s) SubCutaneous every 12 hours  aspirin enteric coated 81milliGRAM(s) Oral daily  oxyCODONE ER Tablet 40milliGRAM(s) Oral every 8 hours  doxazosin 4milliGRAM(s) Oral at bedtime  diltiazem   CD 180milliGRAM(s) Oral daily  simvastatin 40milliGRAM(s) Oral at bedtime  metoprolol 25milliGRAM(s) Oral two times a day  ferrous    sulfate 325milliGRAM(s) Oral daily  buDESOnide   0.5 milliGRAM(s) Respule 0.5milliGRAM(s) Inhalation two times a day  triamcinolone 0.1% Cream 1Application(s) Topical every 12 hours  ALPRAZolam 0.25milliGRAM(s) Oral once    MEDICATIONS  (PRN):  oxyCODONE IR 5milliGRAM(s) Oral every 4 hours PRN Moderate Pain (4 - 6)  oxyCODONE IR 10milliGRAM(s) Oral every 4 hours PRN Severe Pain (7 - 10)      Vital Signs Last 24 Hrs  T(C): 36.3, Max: 36.8 (05-17 @ 00:22)  T(F): 97.4, Max: 98.3 (05-17 @ 00:22)  HR: 62 (60 - 84)  BP: 128/73 (128/73 - 161/80)  BP(mean): --  RR: 18 (18 - 30)  SpO2: 94% (94% - 100%)    I&O's Detail    I & Os for current day (as of 17 May 2017 10:41)  =============================================  IN:    Oral Fluid: 240 ml    Total IN: 240 ml  ---------------------------------------------  OUT:    Voided: 1150 ml    Total OUT: 1150 ml  ---------------------------------------------  Total NET: -910 ml      PHYSICAL EXAM:  Constitutional: Mildly pale, Appears stated age, sitting up in bed and in no distress  Eyes:  EOMI,  Pupils round, No oral cyanosis.  Pulmonary: Non-labored, breath sounds and no wheezing, rales or rhonchi heard bilaterally.  Cardiovascular: S1 and S2, regular rate and rhythm, III/VI systolic murmur at base radiating to carotid  Gastrointestinal: Bowel Sounds present, soft, nontender.   Lymph: No peripheral edema. No cervical lymphadenopathy.  Neurological: Alert, no focal deficits  Skin: Warm, dry.  Psych:  Appears somewhat withdrawn; otherwise mood & affect appropriate    LABS:                   11.1   7.1   )-----------( 252      ( 17 May 2017 06:38 )             32.6     05-17    141  |  104  |  32<H>  ----------------------------<  122<H>  3.6   |  28  |  0.86    Ca    8.8      17 May 2017 06:38    TPro  6.8  /  Alb  2.9<L>  /  TBili  0.5  /  DBili  x   /  AST  27  /  ALT  22  /  AlkPhos  98  05-16    CARDIAC MARKERS ( 17 May 2017 06:38 )  .038 ng/mL / x     / x     / x     / x      CARDIAC MARKERS ( 16 May 2017 18:10 )  .066 ng/mL / x     / x     / x     / x      CARDIAC MARKERS ( 16 May 2017 12:46 )  .065 ng/mL / x     / 81 U/L / x     / 1.6 ng/mL      LIVER FUNCTIONS - ( 16 May 2017 12:46 )  Alb: 2.9 g/dL / Pro: 6.8 g/dL / ALK PHOS: 98 U/L / ALT: 22 U/L DA / AST: 27 U/L / GGT: x               05-16 @ 12:46 Pro Bnp [id number]    CXR: A soft tissue mass is present in the left upper lung measuring 6 cm maximum span. Findings suspicious for the presence of a neoplasm. The right hemidiaphragm is flattened, an emphysematous change. No effusion. No vascular congestion.     ECG: AF, PVC, electronic ventricular pacing    Echo:  PROCEDURE DATE:  05/16/2017        INTERPRETATION:  Ordering Physician: RENEE ORGEL [id number]    Indication: Aortic valve disease    Technician: Marbella Cameron    Study Quality: Fair   A complete transthoracic echocardiographic study was performed utilizing   standard protocol including spectral and color Doppler in all   echocardiographic windows.    Height: 69 inches  Weight: 126 pounds  BSA: 1.7 sq m  Blood Pressure:148/76 mmHg    MEASUREMENTS(CM)  IVS: 1.3  PWT: 1.3  LA: 6.7  AO: 3.9  LVIDd: 6.0  LVIDs: 4.5  LVOT: 2.1    LVEF: 50-55%  RVSP: 103 mmHg  RA Pressure: 15 mmHg  IVC: Greater than 2 cm and does not collapse with respiration    FINDINGS  Left Ventricle:The left ventricle is dilated and hypertrophied with low   normal left ventricular systolic function. No focal wall motion   abnormalities are seen. Approximate ejection fraction is 50-55%.  Right Ventricle: The right ventricle is at the upper limit of normal in   size with preserved right ventricular systolic function.  Left Atrium: The left atrium is severely dilated  Right Atrium: The right atrium is moderately dilated  Mitral Valve: The mitral valve is mildly thickened with mitral annular   calcification present. Severe mitral insufficiency is noted. No mitral   stenosis is seen.  Aortic Valve: The aortic valve is moderate to severely thickened with   moderate limitation and leaflet excursion. Moderate aortic stenosis is   noted. A peak gradient of 38 mmHg and a mean gradient of 20 mmHg is   noted. The estimated aortic valve area is 1.0 sq cm. Moderate to severe   aortic insufficiency is noted.  Tricuspid Valve: The tricuspid valve is mildly thickened with normal   leaflet excursion. Severe tricuspid insufficiency is noted. No tricuspid   stenosis is seen. The estimated right ventricular systolic pressure is   approximately 103 mmHg consistent with severe pulmonary hypertension  Pulmonic Valve: The pulmonic valve is mildly thickened with normal   leaflet excursion. Trace insufficiency and no stenosis is seen.  Diastolic Function: A diastolic relaxation abnormality is noted   consistent with reduced compliance of the left ventricle  Pericardium/Pleura: The pericardium is normalin thickness. A trace to   mild pericardial effusion is noted. No echocardiographic evidence of   tamponade is seen. No pleural effusion is noted      CONCLUSIONS:  Low normal left ventricular systolic function with an approximate   ejection fractionof 50-55%.  A dilated and hypertrophied left ventricle.  Biatrial enlargement.  Severe mitral insufficiency.  Moderate aortic stenosis with moderate to severe aortic insufficiency.  Severe tricuspid insufficiency with severe pulmonary hypertension.  Mild to moderate diastolic dysfunction.  A trace to mild pericardial effusion. REQUESTING PHYSICIAN: Dr. AMELIE Rogel    REASON FOR CONSULT: Asked to see patient for SOB     CHIEF COMPLAINT: SOB x 2 days    HPI:  86 year old man with a history of CAD s/p PCI (LAD 2012, RCA 2010), atrial fibrillation, PPM, COPD, GI bleed (recurrent), carotid artery disease, HTN, HLD, aortic stenosis (mild in 2014), who presents to the ED with complaint of dyspnea.  Symptoms started 2 days ago - some worsening over past 48 hours prompted him to come to the ER.  He is unable to identify exacerbating or alleviating factors; no associated orthopnea, cough, hemoptysis sputum production.  He has has little traditional medical care in several years; last visit with his cardiologist (Dr. Palla) was in 2014; seeking alternative medicine.    5/17/17: Still SOB but slightly improved.  Lung mass noted on imaging and thought to be metastatic lung ca by pulmonatry and BX. being planned.   Denies chest pain.       PAST MEDICAL & SURGICAL HISTORY:  Carotid stenosis  GIB (gastrointestinal bleeding)  CAD (coronary artery disease)  Diverticulitis  Anemia  Polymicrobial Sepsis  Bacterial Endocarditis  Cardiac Pacemaker  COPD (Chronic Obstructive Pulmonary Disease)  Atrial Fibrillation  BPH (Benign Prostatic Hypertrophy)  Asthma, Chronic  HTN (Hypertension)  Renal Failure, Chronic  Renal Failure, Chronic  Pacemaker  CAD (Coronary Artery Disease)  Valvular heart disease  Pulmonary htn.    SOCIAL HISTORY:   Alcohol: Denied  Smoking: Nonsmoker    FAMILY HISTORY:   No pertinent family history    Home MEDICATIONS:  * Patient unable to recall current meds.  In May 2014, he was prescribed digoxin, diltiazem, flomax, singulair, zocor    Allergies:   clindamycin (Unknown)  GoLYTELY (Unknown)  Levaquin (Pruritus; Rash)  P O Contrast (Unknown)  penicillin (Rash)      REVIEW OF SYSTEMS:  CONSTITUTIONAL: + weakness (especially legs with ambulation), no fevers or chills  EYES/ENT: No visual changes;  No vertigo or throat pain   NECK: No pain or stiffness  RESPIRATORY: No cough, wheezing, hemoptysis; + shortness of breath  CARDIOVASCULAR: No chest pain or palpitations  GASTROINTESTINAL: No abdominal pain. No nausea, vomiting, or hematemesis; No diarrhea or constipation. No melena or hematochezia.  GENITOURINARY: No dysuria, frequency or hematuria  NEUROLOGICAL: No numbness or weakness  SKIN: No itching or rash  All other review of systems is negative unless indicated above    MEDICATIONS  (STANDING):  ALBUTerol/ipratropium for Nebulization 3milliLiter(s) Nebulizer every 6 hours  methylPREDNISolone sodium succinate Injectable 40milliGRAM(s) IV Push every 8 hours  heparin  Injectable 5000Unit(s) SubCutaneous every 12 hours  aspirin enteric coated 81milliGRAM(s) Oral daily  oxyCODONE ER Tablet 40milliGRAM(s) Oral every 8 hours  doxazosin 4milliGRAM(s) Oral at bedtime  diltiazem   CD 180milliGRAM(s) Oral daily  simvastatin 40milliGRAM(s) Oral at bedtime  metoprolol 25milliGRAM(s) Oral two times a day  ferrous    sulfate 325milliGRAM(s) Oral daily  buDESOnide   0.5 milliGRAM(s) Respule 0.5milliGRAM(s) Inhalation two times a day  triamcinolone 0.1% Cream 1Application(s) Topical every 12 hours  ALPRAZolam 0.25milliGRAM(s) Oral once    MEDICATIONS  (PRN):  oxyCODONE IR 5milliGRAM(s) Oral every 4 hours PRN Moderate Pain (4 - 6)  oxyCODONE IR 10milliGRAM(s) Oral every 4 hours PRN Severe Pain (7 - 10)      Vital Signs Last 24 Hrs  T(C): 36.3, Max: 36.8 (05-17 @ 00:22)  T(F): 97.4, Max: 98.3 (05-17 @ 00:22)  HR: 62 (60 - 84)  BP: 128/73 (128/73 - 161/80)  BP(mean): --  RR: 18 (18 - 30)  SpO2: 94% (94% - 100%)    I&O's Detail    I & Os for current day (as of 17 May 2017 10:41)  =============================================  IN:    Oral Fluid: 240 ml    Total IN: 240 ml  ---------------------------------------------  OUT:    Voided: 1150 ml    Total OUT: 1150 ml  ---------------------------------------------  Total NET: -910 ml      PHYSICAL EXAM:  Constitutional: Mildly pale, Appears stated age, sitting up in bed and in no distress  Eyes:  EOMI,  Pupils round, No oral cyanosis.  Pulmonary: Non-labored, breath sounds and no wheezing, rales or rhonchi heard bilaterally.  Cardiovascular: S1 and S2, regular rate and rhythm, III/VI systolic murmur at base radiating to carotid  Gastrointestinal: Bowel Sounds present, soft, nontender.   Lymph: No peripheral edema. No cervical lymphadenopathy.  Neurological: Alert, no focal deficits  Skin: Warm, dry.  Psych:  Appears somewhat withdrawn; otherwise mood & affect appropriate    LABS:                   11.1   7.1   )-----------( 252      ( 17 May 2017 06:38 )             32.6     05-17    141  |  104  |  32<H>  ----------------------------<  122<H>  3.6   |  28  |  0.86    Ca    8.8      17 May 2017 06:38    TPro  6.8  /  Alb  2.9<L>  /  TBili  0.5  /  DBili  x   /  AST  27  /  ALT  22  /  AlkPhos  98  05-16    CARDIAC MARKERS ( 17 May 2017 06:38 )  .038 ng/mL / x     / x     / x     / x      CARDIAC MARKERS ( 16 May 2017 18:10 )  .066 ng/mL / x     / x     / x     / x      CARDIAC MARKERS ( 16 May 2017 12:46 )  .065 ng/mL / x     / 81 U/L / x     / 1.6 ng/mL      LIVER FUNCTIONS - ( 16 May 2017 12:46 )  Alb: 2.9 g/dL / Pro: 6.8 g/dL / ALK PHOS: 98 U/L / ALT: 22 U/L DA / AST: 27 U/L / GGT: x               05-16 @ 12:46 Pro Bnp [id number]    dig level 5/17 : 2.3    CXR: A soft tissue mass is present in the left upper lung measuring 6 cm maximum span. Findings suspicious for the presence of a neoplasm. The right hemidiaphragm is flattened, an emphysematous change. No effusion. No vascular congestion.     ECG: AF, PVC, electronic ventricular pacing    Echo:  PROCEDURE DATE:  05/16/2017        INTERPRETATION:  Ordering Physician: RENEE ROGEL [id number]    Indication: Aortic valve disease    Technician: Marbella Cameron    Study Quality: Fair   A complete transthoracic echocardiographic study was performed utilizing   standard protocol including spectral and color Doppler in all   echocardiographic windows.    Height: 69 inches  Weight: 126 pounds  BSA: 1.7 sq m  Blood Pressure:148/76 mmHg    MEASUREMENTS(CM)  IVS: 1.3  PWT: 1.3  LA: 6.7  AO: 3.9  LVIDd: 6.0  LVIDs: 4.5  LVOT: 2.1    LVEF: 50-55%  RVSP: 103 mmHg  RA Pressure: 15 mmHg  IVC: Greater than 2 cm and does not collapse with respiration    FINDINGS  Left Ventricle:The left ventricle is dilated and hypertrophied with low   normal left ventricular systolic function. No focal wall motion   abnormalities are seen. Approximate ejection fraction is 50-55%.  Right Ventricle: The right ventricle is at the upper limit of normal in   size with preserved right ventricular systolic function.  Left Atrium: The left atrium is severely dilated  Right Atrium: The right atrium is moderately dilated  Mitral Valve: The mitral valve is mildly thickened with mitral annular   calcification present. Severe mitral insufficiency is noted. No mitral   stenosis is seen.  Aortic Valve: The aortic valve is moderate to severely thickened with   moderate limitation and leaflet excursion. Moderate aortic stenosis is   noted. A peak gradient of 38 mmHg and a mean gradient of 20 mmHg is   noted. The estimated aortic valve area is 1.0 sq cm. Moderate to severe   aortic insufficiency is noted.  Tricuspid Valve: The tricuspid valve is mildly thickened with normal   leaflet excursion. Severe tricuspid insufficiency is noted. No tricuspid   stenosis is seen. The estimated right ventricular systolic pressure is   approximately 103 mmHg consistent with severe pulmonary hypertension  Pulmonic Valve: The pulmonic valve is mildly thickened with normal   leaflet excursion. Trace insufficiency and no stenosis is seen.  Diastolic Function: A diastolic relaxation abnormality is noted   consistent with reduced compliance of the left ventricle  Pericardium/Pleura: The pericardium is normalin thickness. A trace to   mild pericardial effusion is noted. No echocardiographic evidence of   tamponade is seen. No pleural effusion is noted      CONCLUSIONS:  Low normal left ventricular systolic function with an approximate   ejection fractionof 50-55%.  A dilated and hypertrophied left ventricle.  Biatrial enlargement.  Severe mitral insufficiency.  Moderate aortic stenosis with moderate to severe aortic insufficiency.  Severe tricuspid insufficiency with severe pulmonary hypertension.  Mild to moderate diastolic dysfunction.  A trace to mild pericardial effusion.

## 2017-05-17 NOTE — PROGRESS NOTE ADULT - SUBJECTIVE AND OBJECTIVE BOX
PULMONARY/CRITICAL CARE      INTERVAL HPI/OVERNIGHT EVENTS:    86y MaleHPI:  86 years old male with history CAD, Cardiomyopathy, A Fib, Anemia, pneumonia, CKD, Diverticulosis, GI bleed, BPH, COPD, chronic pain on opioids, presents with 2 day history of SOB.  Daughter reports he is mostly sedentary due to chronic knee pain despite bilateral knee replacements and chronic opioid use.  He reports feeling SOB at rest and increasing with movement but denies cough, fever, chills, chest pain, palpitations.  Aside from weak knees and chronic back pain he denies other symptoms.  He denies changes in medication or diet.  His kinesiologist has him on a low carb, no yeast diet and he has lost >100 lbs on this diet.  The patient and daughter both say that he has been eating his proteins and vegetables.  Patient and daughter [facility] but hasn't seen a cardiologist there.  Last check of his pacemaker was 3 years ago in Dr Palla's office.  He also stopped seeing Dr Cleary since "he wasn't having any breathing issues".  The daughter remembers being told he needs a PET scan, but she thought it was for something in his carotid and did not want him getting any invasive dyes since that might be bad for him.  She reports patient did not smoke but that other people smoke in the house but not in his room.  Both parents  of old age in their 90s. (16 May 2017 17:02)        PAST MEDICAL & SURGICAL HISTORY:  Carotid stenosis  GIB (gastrointestinal bleeding)  CAD (coronary artery disease)  Diverticulitis  Anemia  Polymicrobial Sepsis  Bacterial Endocarditis  Cardiac Pacemaker  COPD (Chronic Obstructive Pulmonary Disease)  Atrial Fibrillation  BPH (Benign Prostatic Hypertrophy)  Asthma, Chronic  HTN (Hypertension)  Renal Failure, Chronic  Renal Failure, Chronic  H/O total knee replacement, bilateral  Pacemaker  CAD (Coronary Artery Disease)        ICU Vital Signs Last 24 Hrs  T(C): 36.7, Max: 36.8 (-17 @ 00:22)  T(F): 98, Max: 98.3 (-17 @ 00:22)  HR: 68 (60 - 84)  BP: 161/80 (134/71 - 161/80)  BP(mean): --  ABP: --  ABP(mean): --  RR: 18 (18 - 30)  SpO2: 99% (95% - 100%)    Qtts:     I&O's Summary    I & Os for current day (as of 17 May 2017 07:32)  =============================================  IN: 240 ml / OUT: 1150 ml / NET: -910 ml          REVIEW OF SYSTEMS:    CONSTITUTIONAL: No fever, weight loss, or fatigue  EYES: No eye pain, visual disturbances, or discharge  ENMT:  No difficulty hearing, tinnitus, vertigo; No sinus or throat pain  NECK: No pain or stiffness  BREASTS: No pain, masses, or nipple discharge  RESPIRATORY: No cough, wheezing, chills or hemoptysis; has shortness of breath  CARDIOVASCULAR: No chest pain, palpitations, dizziness, or leg swelling  GASTROINTESTINAL: No abdominal or epigastric pain. No nausea, vomiting, or hematemesis; No diarrhea or constipation. No melena or hematochezia.  GENITOURINARY: No dysuria, frequency, hematuria, or incontinence  NEUROLOGICAL: No headaches, memory loss, loss of strength, numbness, or tremors  SKIN: No itching, burning, rashes, or lesions   LYMPH NODES: No enlarged glands  ENDOCRINE: No heat or cold intolerance; No hair loss  MUSCULOSKELETAL: No joint pain or swelling; No muscle, back, or extremity pain, no calf tenderness  PSYCHIATRIC: No depression, anxiety, mood swings, or difficulty sleeping  HEME/LYMPH: No easy bruising, or bleeding gums  ALLERGY AND IMMUNOLOGIC: No hives or eczema      PHYSICAL EXAM:    GENERAL: NAD, well-groomed, well-developed, NAD  HEAD:  Atraumatic, Normocephalic  EYES: EOMI, PERRLA, conjunctiva and sclera clear  ENMT: No tonsillar erythema, exudates, or enlargement; Moist mucous membranes, Good dentition, No lesions  NECK: Supple, No JVD, Normal thyroid  NERVOUS SYSTEM:  Alert & Oriented X3, Good concentration; Motor Strength 5/5 B/L upper and lower extremities  CHEST/LUNG:Decreased BS bilaterally; Decreased fremitus and percussion bilat. No rales, rhonchi, wheezing, or rubs  HEART: Regular rate and rhythm; No murmurs, rubs, or gallops  ABDOMEN: Soft, Nontender, Nondistended; Bowel sounds present  EXTREMITIES:  2+ Peripheral Pulses, No clubbing, cyanosis, or edema  LYMPH: No lymphadenopathy noted  SKIN: No rashes or lesions        LABS:                        11.1   7.1   )-----------( 252      ( 17 May 2017 06:38 )             32.6         141  |  104  |  32<H>  ----------------------------<  122<H>  3.6   |  28  |  0.86    Ca    8.8      17 May 2017 06:38    TPro  6.8  /  Alb  2.9<L>  /  TBili  0.5  /  DBili  x   /  AST  27  /  ALT  22  /  AlkPhos  98  -16          vanco through     RADIOLOGY & ADDITIONAL STUDIES:      CRITICAL CARE TIME SPENT:

## 2017-05-17 NOTE — PROGRESS NOTE ADULT - PROBLEM SELECTOR PLAN 3
HR in 60's and paced.  Will continue to monitor.  Continue to hold digoxin. HR in 60's and paced.  Will continue to monitor.  Continue to hold digoxin. Level 2.3 on labs.

## 2017-05-17 NOTE — PROGRESS NOTE ADULT - SUBJECTIVE AND OBJECTIVE BOX
Patient is a 86y old  Male who presents with a chief complaint of Patient complaining of SOB, requesting oxygen (16 May 2017 17:02)      INTERVAL HPI/OVERNIGHT EVENTS: feeling ok, breathing ok as long as he has the oxygen on.  felt lightheaded after walking with physical therapy but symptoms improved with rest.     MEDICATIONS  (STANDING):  ALBUTerol/ipratropium for Nebulization 3milliLiter(s) Nebulizer every 6 hours  methylPREDNISolone sodium succinate Injectable 40milliGRAM(s) IV Push every 8 hours  heparin  Injectable 5000Unit(s) SubCutaneous every 12 hours  aspirin enteric coated 81milliGRAM(s) Oral daily  oxyCODONE ER Tablet 40milliGRAM(s) Oral every 8 hours  doxazosin 4milliGRAM(s) Oral at bedtime  diltiazem   CD 180milliGRAM(s) Oral daily  simvastatin 40milliGRAM(s) Oral at bedtime  metoprolol 25milliGRAM(s) Oral two times a day  ferrous    sulfate 325milliGRAM(s) Oral daily  buDESOnide   0.5 milliGRAM(s) Respule 0.5milliGRAM(s) Inhalation two times a day  triamcinolone 0.1% Cream 1Application(s) Topical every 12 hours  furosemide    Tablet 20milliGRAM(s) Oral daily    MEDICATIONS  (PRN):  oxyCODONE IR 5milliGRAM(s) Oral every 4 hours PRN Moderate Pain (4 - 6)  oxyCODONE IR 10milliGRAM(s) Oral every 4 hours PRN Severe Pain (7 - 10)      Allergies    clindamycin (Unknown)  GoLYTELY (Unknown)  Levaquin (Pruritus; Rash)  P O Contrast (Unknown)  penicillin (Rash)      REVIEW OF SYSTEMS:  CONSTITUTIONAL: No fever or fatigue, + Weight loss  EYES: No eye pain, visual disturbances, or discharge  ENMT:  No difficulty hearing, tinnitus, vertigo; No sinus or throat pain  NECK: No pain or stiffness  BREASTS: No pain, masses, or nipple discharge  RESPIRATORY: No cough, wheezing, chills or hemoptysis;   CARDIOVASCULAR: No chest pain, palpitations, no leg swelling today  GASTROINTESTINAL: No abdominal or epigastric pain. No nausea, vomiting, or hematemesis; No diarrhea or constipation. No melena or hematochezia.  GENITOURINARY: No dysuria, frequency, hematuria, or incontinence  NEUROLOGICAL: No headaches, memory loss, numbness, or tremors, + generalized weakness  SKIN: No itching, burning, +psoriatic rash or lesions   LYMPH NODES: No enlarged glands  ENDOCRINE: No heat or cold intolerance; No hair loss; No polydipsia or polyuria  MUSCULOSKELETAL: + chronic pain - controlled on oxycontin  PSYCHIATRIC: No depression, anxiety, mood swings, or difficulty sleeping  HEME/LYMPH: No easy bruising, or bleeding gums  ALLERGY AND IMMUNOLOGIC: No hives or eczema    Vital Signs Last 24 Hrs  T(C): 36.3, Max: 36.8 (05-17 @ 00:22)  T(F): 97.4, Max: 98.3 (05-17 @ 00:22)  HR: 61 (60 - 84)  BP: 141/74 (128/73 - 161/80)  BP(mean): --  RR: 18 (18 - 23)  SpO2: 99% (94% - 100%)    PHYSICAL EXAM:  GENERAL: NAD, well-groomed, +cachectic  HEAD:  Atraumatic, Normocephalic  EYES:  conjunctiva and sclera clear  ENMT: No tonsillar erythema, exudates, or enlargement; Moist mucous membranes,   NECK: Supple, No JVD,  NERVOUS SYSTEM:  Alert & Oriented X3, Good concentration; able to move all extremities, no gross sensory deficits   CHEST/LUNG: Clear to auscultation bilaterally; No rales, rhonchi, wheezing, or rubs  HEART: Regular rate and rhythm;   ABDOMEN: Soft, Nontender, Nondistended; Bowel sounds present  EXTREMITIES:  2+ Peripheral Pulses, No clubbing, cyanosis, or edema  LYMPH: No lymphadenopathy noted  SKIN: No rashes or lesions    LABS:                        11.1   7.1   )-----------( 252      ( 17 May 2017 06:38 )             32.6     17 May 2017 06:38    141    |  104    |  32     ----------------------------<  122    3.6     |  28     |  0.86     Ca    8.8        17 May 2017 06:38      PT/INR - ( 17 May 2017 11:59 )   PT: 11.5 sec;   INR: 1.05 ratio             CAPILLARY BLOOD GLUCOSE      RADIOLOGY & ADDITIONAL TESTS:  Echo:   Low normal left ventricular systolic function with an approximate   ejection fractionof 50-55%.  A dilated and hypertrophied left ventricle.  Biatrial enlargement.  Severe mitral insufficiency.  Moderate aortic stenosis with moderate to severe aortic insufficiency.  Severe tricuspid insufficiency with severe pulmonary hypertension.  Mild to moderate diastolic dysfunction.  A trace to mild pericardial effusion.    Imaging Personally Reviewed:  [x ] YES   Xray: Questionable presence of a small left apical pneumothorax. Recommend a short interval follow-up exam to reevaluate this finding.    Airspace disease has progressed at the right lung base with loss of definition of the right costophrenic angle, effusion-consolidation. Left lung mass unchanged in appearance since prior study. Heart size appears enlarged, magnified secondary to portable technique. No acute osseous abnormalities evident.    Consultant(s) Notes Reviewed:  pulm, card    Care Discussed with Consultants/Other Providers: PMD - mass presence and follow up

## 2017-05-18 ENCOUNTER — TRANSCRIPTION ENCOUNTER (OUTPATIENT)
Age: 82
End: 2017-05-18

## 2017-05-18 LAB
BASE EXCESS BLDA CALC-SCNC: 4 MMOL/L — HIGH (ref -2–2)
BLOOD GAS COMMENTS: SIGNIFICANT CHANGE UP
BLOOD GAS SOURCE: SIGNIFICANT CHANGE UP
HCO3 BLDA-SCNC: 28 MMOL/L — SIGNIFICANT CHANGE UP (ref 21–29)
HOROWITZ INDEX BLDA+IHG-RTO: SIGNIFICANT CHANGE UP
PCO2 BLDA: 47 MMHG — HIGH (ref 32–46)
PH BLD: 7.39 — SIGNIFICANT CHANGE UP (ref 7.35–7.45)
PO2 BLDA: 93 MMHG — SIGNIFICANT CHANGE UP (ref 74–108)
SAO2 % BLDA: 96 % — SIGNIFICANT CHANGE UP (ref 92–96)

## 2017-05-18 PROCEDURE — 71010: CPT | Mod: 26

## 2017-05-18 PROCEDURE — 99233 SBSQ HOSP IP/OBS HIGH 50: CPT

## 2017-05-18 RX ORDER — MAGNESIUM HYDROXIDE 400 MG/1
30 TABLET, CHEWABLE ORAL DAILY
Qty: 0 | Refills: 0 | Status: DISCONTINUED | OUTPATIENT
Start: 2017-05-18 | End: 2017-05-19

## 2017-05-18 RX ORDER — DOCUSATE SODIUM 100 MG
100 CAPSULE ORAL THREE TIMES A DAY
Qty: 0 | Refills: 0 | Status: DISCONTINUED | OUTPATIENT
Start: 2017-05-18 | End: 2017-05-19

## 2017-05-18 RX ORDER — ALBUTEROL 90 UG/1
2.5 AEROSOL, METERED ORAL
Qty: 0 | Refills: 0 | Status: DISCONTINUED | OUTPATIENT
Start: 2017-05-18 | End: 2017-05-19

## 2017-05-18 RX ADMIN — MAGNESIUM HYDROXIDE 30 MILLILITER(S): 400 TABLET, CHEWABLE ORAL at 14:11

## 2017-05-18 RX ADMIN — Medication 3 MILLILITER(S): at 19:02

## 2017-05-18 RX ADMIN — Medication 40 MILLIGRAM(S): at 05:53

## 2017-05-18 RX ADMIN — Medication 1 APPLICATION(S): at 17:54

## 2017-05-18 RX ADMIN — Medication 20 MILLIGRAM(S): at 17:55

## 2017-05-18 RX ADMIN — OXYCODONE HYDROCHLORIDE 10 MILLIGRAM(S): 5 TABLET ORAL at 22:44

## 2017-05-18 RX ADMIN — Medication 1 APPLICATION(S): at 05:53

## 2017-05-18 RX ADMIN — Medication 4 MILLIGRAM(S): at 21:45

## 2017-05-18 RX ADMIN — Medication 0.5 MILLIGRAM(S): at 07:10

## 2017-05-18 RX ADMIN — OXYCODONE HYDROCHLORIDE 40 MILLIGRAM(S): 5 TABLET ORAL at 06:38

## 2017-05-18 RX ADMIN — Medication 100 MILLIGRAM(S): at 14:11

## 2017-05-18 RX ADMIN — Medication 180 MILLIGRAM(S): at 05:54

## 2017-05-18 RX ADMIN — Medication 0.5 MILLIGRAM(S): at 19:02

## 2017-05-18 RX ADMIN — Medication 25 MILLIGRAM(S): at 05:54

## 2017-05-18 RX ADMIN — Medication 3 MILLILITER(S): at 07:10

## 2017-05-18 RX ADMIN — OXYCODONE HYDROCHLORIDE 10 MILLIGRAM(S): 5 TABLET ORAL at 16:08

## 2017-05-18 RX ADMIN — Medication 81 MILLIGRAM(S): at 12:12

## 2017-05-18 RX ADMIN — Medication 25 MILLIGRAM(S): at 17:55

## 2017-05-18 RX ADMIN — Medication 325 MILLIGRAM(S): at 12:12

## 2017-05-18 RX ADMIN — Medication 100 MILLIGRAM(S): at 21:45

## 2017-05-18 RX ADMIN — Medication 3 MILLILITER(S): at 12:46

## 2017-05-18 RX ADMIN — HEPARIN SODIUM 5000 UNIT(S): 5000 INJECTION INTRAVENOUS; SUBCUTANEOUS at 05:54

## 2017-05-18 RX ADMIN — OXYCODONE HYDROCHLORIDE 10 MILLIGRAM(S): 5 TABLET ORAL at 21:45

## 2017-05-18 RX ADMIN — HEPARIN SODIUM 5000 UNIT(S): 5000 INJECTION INTRAVENOUS; SUBCUTANEOUS at 17:55

## 2017-05-18 RX ADMIN — OXYCODONE HYDROCHLORIDE 40 MILLIGRAM(S): 5 TABLET ORAL at 06:12

## 2017-05-18 RX ADMIN — OXYCODONE HYDROCHLORIDE 10 MILLIGRAM(S): 5 TABLET ORAL at 16:45

## 2017-05-18 RX ADMIN — Medication 20 MILLIGRAM(S): at 05:54

## 2017-05-18 RX ADMIN — SIMVASTATIN 40 MILLIGRAM(S): 20 TABLET, FILM COATED ORAL at 21:45

## 2017-05-18 NOTE — PROGRESS NOTE ADULT - PROBLEM SELECTOR PLAN 3
Cardiology eval appreciated  Echo done - chronic diastolic HF, ?If symptoms due to acute component.   Med management per cardiology

## 2017-05-18 NOTE — DISCHARGE NOTE ADULT - INSTRUCTIONS
no added salt  cut food up for patient - but tolerates regular consistency  as per daughter- no bread or 'yeast' containing products.

## 2017-05-18 NOTE — DISCHARGE NOTE ADULT - HOSPITAL COURSE
86 years old male with history CAD, Cardiomyopathy/chronic diastolic HF with EF 50% with valvular heart disease, chronic atrial fibrillation, anemia, CKD, Diverticulosis, GI bleed, BPH, COPD, chronic pain on opioids, presents with 2 day history of SOB.  Daughter reports he is mostly sedentary due to chronic knee pain despite bilateral knee replacements and chronic opioid use.  He reported feeling SOB at rest and increasing with movement but denies cough, fever, chills, chest pain, palpitations.  Aside from weak knees and chronic back pain he denies other symptoms.  He denies changes in medication or diet.  His kinesiologist has him on a low carb, no yeast diet and he has lost >100 lbs on this diet.  The patient and daughter both say that he has been eating his proteins and vegetables.  Patient [facility] but hasn't seen a cardiologist or pulmonologist in 3 years.  Last check of his pacemaker was 3 years ago in Dr Palla's office.  He also stopped seeing Dr Cleary since "he wasn't having any breathing issues".  The daughter remembers being told he needs a PET scan, but she did not want him getting any invasive dyes since that might be bad for him.  She reports patient did not smoke but that other people smoke in the house but not in his room.  The patient was admitted to telemetry.  He was evaluated by Dr Palla et al from cardiology and Dr Cleary from pulmonary.    The patient has multiple cardiac diseases.  He has afib which is rate controlled.  His digoxin level was found to be elevated at 2.8.  his digoxin was held and levels were monitored. He has CAD for which he needs to continue aspirin therapy. He 86 years old male with history CAD, Cardiomyopathy/chronic diastolic HF with EF 50% with valvular heart disease, chronic atrial fibrillation, anemia, CKD, Diverticulosis, GI bleed, BPH, COPD, chronic pain on opioids, presents with 2 day history of SOB.  Daughter reports he is mostly sedentary due to chronic knee pain despite bilateral knee replacements and chronic opioid use.  He reported feeling SOB at rest and increasing with movement but denies cough, fever, chills, chest pain, palpitations.  Aside from weak knees and chronic back pain he denies other symptoms.  He denies changes in medication or diet.  His kinesiologist has him on a low carb, no yeast diet and he has lost >100 lbs on this diet.  The patient and daughter both say that he has been eating his proteins and vegetables.  Patient and daughter report [facility] but hasn't seen a cardiologist or pulmonologist in 3 years.  Last check of his pacemaker was 3 years ago in Dr Palla's office.  He also stopped seeing Dr Cleary since "he wasn't having any breathing issues".  The daughter remembers being told he needs a PET scan, but she did not want him getting any invasive dyes since that might be bad for him.  She reports patient did not smoke but that other people smoke in the house but not in his room.  The patient was admitted to telemetry.  He was evaluated by Dr Palla et al from cardiology and Dr Cleary from pulmonary.    The patient has multiple cardiac diseases that were managed by cardiology.  ·	He has afib which is rate controlled.  Patient refused AC for CVA prevention.  ·	His digoxin level was found to be elevated at 2.8.  his digoxin was held and levels were monitored.   ·	He has CAD for which he needs to continue aspirin therapy.  ·	Chronic Diastolic HF- He had an echocardiogram which showed Low normal left ventricular systolic function with an approximate ejection fraction of 50-55%.A dilated and hypertrophied left ventricle. Biatrial enlargement. Severe mitral insufficiency. Moderate aortic stenosis with moderate to severe aortic insufficiency. Severe tricuspid insufficiency with severe pulmonary hypertension. Mild to moderate diastolic dysfunction. A trace to mild pericardial effusion.  However, considering his CXR and CT chest results (see below) it is felt that his chronic diastolic HF is not the main cause of his dyspnea symptoms  He was medically managed by cardiology.    ·	Valvular heart disease - see echo above.  Medical management per cardiology.   ·	Pacemaker - Functioning appropriately on Tele.  Interrogation done , reviewed , normal function , good battery status 5.5 years left , recommend outpatient follow up to monitor the PPM function.     Dr Cleary saw him from pulmonary.  He was given IV steroids which were tapered to oral for his COPD.  He was also given inhalers and nebulizers.  He felt SOB every time the oxygen was removed - He is stable on 3 L NC.   Chest XR showed pulmonary mass.  He had a CT chest which showed a mass is present in the left upper lobe measuring 65 mm vertical height, 71 mm in depth, 47 mm in width. This mass extends to the pleural surface and to the left suprahilar region. The appearance is consistent with the presence of a  neoplasm. There are multiple nodes in the mediastinum which although are in the subcentimeter  size range must be considered suspicious for the presence of metastatic disease.  Small bibasilar pleural effusions are present. Small pericardial effusion is present. No evidence of thoracic aortic aneurysm. A diffuse groundglass appearance is identified in the right lung. Scattered groundglass appearance also present in the left lung.    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   After the CT biopsy follow up CXR showed small pneumothorax.  He had serial CXR which showed that it was small and stable.  He was seen by Dr Tristin De Jesus from oncology..????????????????????????????.....    For his BPH he was given cardura as an inpatient and did not have urine retention symptoms.    He was given his chronic oxycontin and prn oxycodone.  His pain was controlled.  He did have opioid induced constipation he was started on colace and MOM.    He was seen by Physical Therapy and found to have poor endurance and balance.  It was recommended that he go to [facility].    Advanced directives were discussed.  Patient Full Code for now.  HCP forms were given to patient to fill out.     [facility]. 86 years old male with history CAD, Cardiomyopathy/chronic diastolic HF with EF 50% with valvular heart disease, chronic atrial fibrillation, anemia, CKD, Diverticulosis, GI bleed, BPH, COPD, chronic pain on opioids, presents with 2 day history of SOB.  Daughter reports he is mostly sedentary due to chronic knee pain despite bilateral knee replacements and chronic opioid use.  He reported feeling SOB at rest and increasing with movement but denies cough, fever, chills, chest pain, palpitations.  Aside from weak knees and chronic back pain he denies other symptoms.  He denies changes in medication or diet.  His kinesiologist has him on a low carb, no yeast diet and he has lost >100 lbs on this diet.  The patient and daughter both say that he has been eating his proteins and vegetables.  Patient and daughter report [facility] but hasn't seen a cardiologist or pulmonologist in 3 years.  Last check of his pacemaker was 3 years ago in Dr Palla's office.  He also stopped seeing Dr Cleary since "he wasn't having any breathing issues".  The daughter remembers being told he needs a PET scan, but she did not want him getting any invasive dyes since that might be bad for him.  She reports patient did not smoke but that other people smoke in the house but not in his room.  The patient was admitted to telemetry.  He was evaluated by Dr Palla et al from cardiology and Dr Cleary from pulmonary.    The patient has multiple cardiac diseases that were managed by cardiology.  ·	He has afib which is rate controlled.  Patient refused AC for CVA prevention.  ·	His digoxin level was found to be elevated at 2.8.  his digoxin was held and levels were monitored.   ·	He has CAD for which he needs to continue aspirin therapy.  ·	Chronic Diastolic HF- He had an echocardiogram which showed Low normal left ventricular systolic function with an approximate ejection fraction of 50-55%.A dilated and hypertrophied left ventricle. Biatrial enlargement. Severe mitral insufficiency. Moderate aortic stenosis with moderate to severe aortic insufficiency. Severe tricuspid insufficiency with severe pulmonary hypertension. Mild to moderate diastolic dysfunction. A trace to mild pericardial effusion.  However, considering his CXR and CT chest results (see below) it is felt that his chronic diastolic HF is not the main cause of his dyspnea symptoms  He was medically managed by cardiology.    ·	Valvular heart disease - see echo above.  Medical management per cardiology.   ·	Pacemaker - Functioning appropriately on Tele.  Interrogation done , reviewed , normal function , good battery status 5.5 years left , recommend outpatient follow up to monitor the PPM function.     Dr Cleary saw him from pulmonary.  He was given IV steroids which were tapered to oral for his COPD.  He was also given inhalers and nebulizers.  He felt SOB every time the oxygen was removed - He is stable on 3 L NC.   Chest XR showed pulmonary mass.  He had a CT chest which showed a mass is present in the left upper lobe measuring 65 mm vertical height, 71 mm in depth, 47 mm in width. This mass extends to the pleural surface and to the left suprahilar region. The appearance is consistent with the presence of a  neoplasm. There are multiple nodes in the mediastinum which although are in the subcentimeter  size range must be considered suspicious for the presence of metastatic disease.  Small bibasilar pleural effusions are present. Small pericardial effusion is present. No evidence of thoracic aortic aneurysm. A diffuse groundglass appearance is identified in the right lung. Scattered groundglass appearance also present in the left lung.    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   After the CT biopsy follow up CXR showed small pneumothorax.  He had serial CXR which showed that it was small and stable.  He was seen by Dr Tristin De Jesus from oncology. There was an extensive discussion with the patient, son and daughter about biopsy results and treatment options.     For his BPH he was given cardura as an inpatient and did not have urine retention symptoms.    He was given his chronic oxycontin and prn oxycodone.  His pain was controlled.  He did have opioid induced constipation he was started on colace and MOM.    He was seen by Physical Therapy and found to have poor endurance and balance.  It was recommended that he go to [facility].    Advanced directives were discussed.  Patient Full Code for now.  HCP forms were given to patient to fill out.     [facility]. 86 years old male with history CAD, Cardiomyopathy/chronic diastolic HF with EF 50% with valvular heart disease, chronic atrial fibrillation, anemia, CKD, Diverticulosis, GI bleed, BPH, COPD, chronic pain on opioids, presents with 2 day history of SOB.  Daughter reports he is mostly sedentary due to chronic knee pain despite bilateral knee replacements and chronic opioid use.  He reported feeling SOB at rest and increasing with movement but denies cough, fever, chills, chest pain, palpitations.  Aside from weak knees and chronic back pain he denies other symptoms.  He denies changes in medication or diet.  His kinesiologist has him on a low carb, no yeast diet and he has lost >100 lbs on this diet.  The patient and daughter both say that he has been eating his proteins and vegetables.  Patient and daughter report [facility] but hasn't seen a cardiologist or pulmonologist in 3 years.  Last check of his pacemaker was 3 years ago in Dr Palla's office.  He also stopped seeing Dr Cleary since "he wasn't having any breathing issues".  The daughter remembers being told he needs a PET scan, but she did not want him getting any invasive dyes since that might be bad for him.  She reports patient did not smoke but that other people smoke in the house but not in his room.  The patient was admitted to telemetry.  He was evaluated by Dr Palla et al from cardiology and Dr Cleary from pulmonary.    The patient has multiple cardiac diseases that were managed by cardiology.  ·	He has afib which is rate controlled.  Patient refused AC for CVA prevention.  ·	His digoxin level was found to be elevated at 2.8.  his digoxin was held and levels were monitored.   ·	He has CAD for which he needs to continue aspirin therapy.  ·	Chronic Diastolic HF- He had an echocardiogram which showed Low normal left ventricular systolic function with an approximate ejection fraction of 50-55%.A dilated and hypertrophied left ventricle. Biatrial enlargement. Severe mitral insufficiency. Moderate aortic stenosis with moderate to severe aortic insufficiency. Severe tricuspid insufficiency with severe pulmonary hypertension. Mild to moderate diastolic dysfunction. A trace to mild pericardial effusion.  However, considering his CXR and CT chest results (see below) it is felt that his chronic diastolic HF is not the main cause of his dyspnea symptoms  He was medically managed by cardiology.    ·	Valvular heart disease - see echo above.  Medical management per cardiology.   ·	Pacemaker - Functioning appropriately on Tele.  Interrogation done , reviewed , normal function , good battery status 5.5 years left , recommend outpatient follow up to monitor the PPM function.     Dr Cleary saw him from pulmonary.  He was given IV steroids which were tapered to oral for his COPD.  He was also given inhalers and nebulizers.  He felt SOB every time the oxygen was removed - He is stable on 3 L NC.   Chest XR showed pulmonary mass.  He had a CT chest which showed a mass is present in the left upper lobe measuring 65 mm vertical height, 71 mm in depth, 47 mm in width. This mass extends to the pleural surface and to the left suprahilar region. The appearance is consistent with the presence of a  neoplasm. There are multiple nodes in the mediastinum which although are in the subcentimeter  size range must be considered suspicious for the presence of metastatic disease.  Small bibasilar pleural effusions are present. Small pericardial effusion is present. No evidence of thoracic aortic aneurysm. A diffuse groundglass appearance is identified in the right lung. Scattered groundglass appearance also present in the left lung.    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   After the CT biopsy follow up CXR showed small pneumothorax.  He had serial CXR which showed that it was small and stable.  He was seen by Dr Tristin De Jesus from oncology. There was an extensive discussion with the patient, son and daughter about biopsy results and treatment options.  Head CT was done to evaluate for metasteses Generalized age-appropriate parenchymal volume loss without disproportionate ventriculomegaly. Mild to moderate chronic ischemic gliotic cerebral hemisphere white matter changes. Bilateral cerebellar dentate nuclei and bilateral basal ganglia calcifications. There is no edema, intra-axial or extra-axial hemorrhage or acute territorial infarct. No mass effect. Vascular calcification. Sinuses clear. No calvarial destructive lesions. Multilevel bilateral nonspecific small calcified scalp nodules.    For his BPH he was given cardura as an inpatient and did not have urine retention symptoms.    He was given his chronic oxycontin and prn oxycodone.  His pain was controlled.  He did have opioid induced constipation he was started on colace and MOM.    He was seen by Physical Therapy and found to have poor endurance and balance.  It was recommended that he go to [facility].    Advanced directives were discussed.  Patient Full Code for now.  HCP forms were given to patient to fill out.     [facility]. 86 years old male with history CAD, Cardiomyopathy/chronic diastolic HF with EF 50% with valvular heart disease, chronic atrial fibrillation, anemia, CKD, Diverticulosis, GI bleed, BPH, COPD, chronic pain on opioids, presents with 2 day history of SOB.  Daughter reports he is mostly sedentary due to chronic knee pain despite bilateral knee replacements and chronic opioid use.  He reported feeling SOB at rest and increasing with movement but denies cough, fever, chills, chest pain, palpitations.  Aside from weak knees and chronic back pain he denies other symptoms.  He denies changes in medication or diet.  His kinesiologist has him on a low carb, no yeast diet and he has lost >100 lbs on this diet.  The patient and daughter both say that he has been eating his proteins and vegetables.  Patient and daughter report [facility] but hasn't seen a cardiologist or pulmonologist in 3 years.  Last check of his pacemaker was 3 years ago in Dr Palla's office.  He also stopped seeing Dr Cleary since "he wasn't having any breathing issues".  The daughter remembers being told he needs a PET scan, but she did not want him getting any invasive dyes since that might be bad for him.  She reports patient did not smoke but that other people smoke in the house but not in his room.  The patient was admitted to telemetry.  He was evaluated by Dr Palla et al from cardiology and Dr Cleary from pulmonary.    The patient has multiple cardiac diseases that were managed by cardiology.  ·	He has afib which is rate controlled.  Patient refused AC for CVA prevention.  ·	His digoxin level was found to be elevated at 2.8.  his digoxin was held and levels were monitored.  Cardiology decided to discontinue digoxin and monitor patient.   ·	He has CAD for which he needs to continue aspirin therapy.  ·	Chronic Diastolic HF- He had an echocardiogram which showed Low normal left ventricular systolic function with an approximate ejection fraction of 50-55%.A dilated and hypertrophied left ventricle. Biatrial enlargement. Severe mitral insufficiency. Moderate aortic stenosis with moderate to severe aortic insufficiency. Severe tricuspid insufficiency with severe pulmonary hypertension. Mild to moderate diastolic dysfunction. A trace to mild pericardial effusion.  However, considering his CXR and CT chest results (see below) it is felt that his chronic diastolic HF is not the main cause of his dyspnea symptoms  He was medically managed by cardiology.    ·	Valvular heart disease - see echo above.  Medical management per cardiology.   ·	Pacemaker - Functioning appropriately on Tele.  Interrogation done , reviewed , normal function , good battery status 5.5 years left , recommend outpatient follow up to monitor the PPM function.     Dr Cleary saw him from pulmonary.  He was given IV steroids which were tapered to oral for his COPD.  He was also given inhalers and nebulizers.  He felt SOB every time the oxygen was removed - He is stable on 3 L NC.   Chest XR showed pulmonary mass.  He had a CT chest which showed a mass is present in the left upper lobe measuring 65 mm vertical height, 71 mm in depth, 47 mm in width. This mass extends to the pleural surface and to the left suprahilar region. The appearance is consistent with the presence of a  neoplasm. There are multiple nodes in the mediastinum which although are in the subcentimeter  size range must be considered suspicious for the presence of metastatic disease.  Small bibasilar pleural effusions are present. Small pericardial effusion is present. No evidence of thoracic aortic aneurysm. A diffuse groundglass appearance is identified in the right lung. Scattered groundglass appearance also present in the left lung.    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   After the CT biopsy follow up CXR showed small pneumothorax.  He had serial CXR which showed that it was small and stable.  He was seen by Dr Tristin De Jesus from oncology. There was an extensive discussion with the patient, son and daughter about biopsy results and treatment options.  Head CT was done to evaluate for metasteses Generalized age-appropriate parenchymal volume loss without disproportionate ventriculomegaly. Mild to moderate chronic ischemic gliotic cerebral hemisphere white matter changes. Bilateral cerebellar dentate nuclei and bilateral basal ganglia calcifications. There is no edema, intra-axial or extra-axial hemorrhage or acute territorial infarct. No mass effect. Vascular calcification. Sinuses clear. No calvarial destructive lesions. Multilevel bilateral nonspecific small calcified scalp nodules.    For his BPH he was given cardura as an inpatient and did not have urine retention symptoms.    He was given his chronic oxycontin and prn oxycodone.  His pain was controlled.  He did have opioid induced constipation he was started on colace and MOM.    He was seen by Physical Therapy and found to have poor endurance and balance.  It was recommended that he go to [facility].    Advanced directives were discussed.  Patient Full Code for now.  HCP forms were given to patient to fill out.     [facility].    Patient to be discharged to [facility] with out-patient follow up.

## 2017-05-18 NOTE — DISCHARGE NOTE ADULT - CARE PROVIDER_API CALL
Dr Beckford,   Phone: [phone]  Fax: [phone]    Mahamed Cleary), Critical Care Medicine; Internal Medicine; Pulmonary Disease  [address] [place] [address]  Phone: [phone]  Fax: [phone]    Palla, Venugopal R (MD), Cardiovascular Disease; Internal Medicine  [address] [place] [address]  Phone: [phone]  Fax: [phone]    Tristin De Jesus (JESICA), Internal Medicine; Medical Oncology  [address] [place] [address]  Phone: [phone]  Fax: [phone] Mahamed Cleary), Critical Care Medicine; Internal Medicine; Pulmonary Disease  [address] [place] [address]  Phone: [phone]  Fax: [phone]    Palla, Venugopal R (MD), Cardiovascular Disease; Internal Medicine  [address] [place] [address]  Phone: [phone]  Fax: [phone]    Dr Beckford,   Phone: [phone]  Fax: [phone]    Tristin De Jesus (JESICA), Internal Medicine; Medical Oncology  [address] [place] [address]  Phone: [phone]  Fax: [phone]

## 2017-05-18 NOTE — DISCHARGE NOTE ADULT - CARE PLAN
Principal Discharge DX:	Other emphysema  Secondary Diagnosis:	Chronic diastolic congestive heart failure  Secondary Diagnosis:	Digoxin overdose, accidental or unintentional, initial encounter  Secondary Diagnosis:	Adenocarcinoma of left lung Principal Discharge DX:	Other emphysema  Goal:	breathe better  Instructions for follow-up, activity and diet:	slow steroid taper  oxygen via nasal cannula as needed.   continue inhalers  f/u with Dr Cleary  Secondary Diagnosis:	Chronic diastolic congestive heart failure  Instructions for follow-up, activity and diet:	medical management per cardiology  monitor lasix  Secondary Diagnosis:	Digoxin overdose, accidental or unintentional, initial encounter  Instructions for follow-up, activity and diet:	was on 0.25mg daily, on admission level was 2.8  restart at lower dose when level <1.5  monitor digoxin levels.  Secondary Diagnosis:	Adenocarcinoma of left lung Principal Discharge DX:	Other emphysema  Goal:	breathe better  Instructions for follow-up, activity and diet:	slow steroid taper  oxygen via nasal cannula as needed.   continue inhalers  f/u with Dr Cleary  Secondary Diagnosis:	Chronic diastolic congestive heart failure  Instructions for follow-up, activity and diet:	medical management per cardiology  monitor lasix  Secondary Diagnosis:	Digoxin overdose, accidental or unintentional, initial encounter  Instructions for follow-up, activity and diet:	was on 0.25mg daily, on admission level was 2.8  restart at lower dose when level <1.5  monitor digoxin levels.  Secondary Diagnosis:	Adenocarcinoma of left lung  Instructions for follow-up, activity and diet:	f/u with oncology Principal Discharge DX:	Other emphysema  Goal:	breathe better  Instructions for follow-up, activity and diet:	slow steroid taper  oxygen via nasal cannula as needed.   continue inhalers  f/u with Dr Cleary  Secondary Diagnosis:	Chronic diastolic congestive heart failure  Instructions for follow-up, activity and diet:	medical management per cardiology  monitor lasix  Secondary Diagnosis:	Digoxin overdose, accidental or unintentional, initial encounter  Instructions for follow-up, activity and diet:	was on 0.25mg daily, on admission level was 2.8, last level was 1.4  as per cardiology - discontinue for now and follow in office.  Secondary Diagnosis:	Adenocarcinoma of left lung  Instructions for follow-up, activity and diet:	f/u with oncology

## 2017-05-18 NOTE — DISCHARGE NOTE ADULT - PROVIDER TOKENS
FREE:[LAST:[Dr Beckford],PHONE:[phone]],FAX:[phone]]],TOKEN:'[id number]',TOKEN:'[id number]',TOKEN:'[id number]' TOKEN:'[id number]:'[id number]',FREE:[LAST:[Dr Beckford],PHONE:[phone]],FAX:[phone]]],TOKEN:'[id number]'

## 2017-05-18 NOTE — DISCHARGE NOTE ADULT - PATIENT PORTAL LINK FT
“You can access the [facility] Patient Portal, offered by [facility], by registering with the following website: [email or web address]”

## 2017-05-18 NOTE — PROGRESS NOTE ADULT - SUBJECTIVE AND OBJECTIVE BOX
REQUESTING PHYSICIAN: Dr. AMELIE Rogel    REASON FOR CONSULT: Asked to see patient for SOB     CHIEF COMPLAINT: SOB x 2 days    HPI:  86 year old man with a history of CAD s/p PCI (LAD 2012, RCA 2010), atrial fibrillation, PPM, COPD, GI bleed (recurrent), carotid artery disease, HTN, HLD, aortic stenosis (mild in 2014), who presents to the ED with complaint of dyspnea.  Symptoms started 2 days ago - some worsening over past 48 hours prompted him to come to the ER.  He is unable to identify exacerbating or alleviating factors; no associated orthopnea, cough, hemoptysis sputum production.  He has has little traditional medical care in several years; last visit with his cardiologist (Dr. Palla) was in 2014; seeking alternative medicine.    5/17/17: Still SOB but slightly improved.  Lung mass noted on imaging and thought to be metastatic lung ca by pulmonary and BX. being planned.   Denies chest pain.     5/18/2017  Patient is feeling shortness of breath ,  pulse ox around 95 % , his heart rate is controlled ,  denies any chest pain     PAST MEDICAL & SURGICAL HISTORY:  Carotid stenosis  GIB (gastrointestinal bleeding)  CAD (coronary artery disease)  Diverticulitis  Anemia  Polymicrobial Sepsis  Bacterial Endocarditis  Cardiac Pacemaker  COPD (Chronic Obstructive Pulmonary Disease)  Atrial Fibrillation  BPH (Benign Prostatic Hypertrophy)  Asthma, Chronic  HTN (Hypertension)  Renal Failure, Chronic  Renal Failure, Chronic  Pacemaker  CAD (Coronary Artery Disease)  Valvular heart disease  Pulmonary htn.        REVIEW OF SYSTEMS:  denies any chest pain   All other review of systems is negative unless indicated above    MEDICATIONS  (STANDING):  ALBUTerol/ipratropium for Nebulization 3milliLiter(s) Nebulizer every 6 hours  heparin  Injectable 5000Unit(s) SubCutaneous every 12 hours  aspirin enteric coated 81milliGRAM(s) Oral daily  oxyCODONE ER Tablet 40milliGRAM(s) Oral every 8 hours  doxazosin 4milliGRAM(s) Oral at bedtime  diltiazem   CD 180milliGRAM(s) Oral daily  simvastatin 40milliGRAM(s) Oral at bedtime  metoprolol 25milliGRAM(s) Oral two times a day  ferrous    sulfate 325milliGRAM(s) Oral daily  buDESOnide   0.5 milliGRAM(s) Respule 0.5milliGRAM(s) Inhalation two times a day  triamcinolone 0.1% Cream 1Application(s) Topical every 12 hours  furosemide    Tablet 20milliGRAM(s) Oral daily  predniSONE   Tablet 20milliGRAM(s) Oral two times a day    MEDICATIONS  (PRN):  oxyCODONE IR 5milliGRAM(s) Oral every 4 hours PRN Moderate Pain (4 - 6)  oxyCODONE IR 10milliGRAM(s) Oral every 4 hours PRN Severe Pain (7 - 10)  ALBUTerol    0.083% 2.5milliGRAM(s) Nebulizer every 2 hours PRN Shortness of Breath and/or Wheezing  Vital Signs Last 24 Hrs  T(C): 36.9, Max: 36.9 (05-18 @ 09:47)  T(F): 98.4, Max: 98.4 (05-18 @ 09:47)  HR: 69 (60 - 71)  BP: 144/68 (112/62 - 144/68)  BP(mean): --  RR: 18 (17 - 20)  SpO2: 96% (92% - 100%)    I&O's Summary  I & Os for 24h ending 18 May 2017 07:00  =============================================  IN: 340 ml / OUT: 250 ml / NET: 90 ml    I & Os for current day (as of 18 May 2017 10:56)  =============================================  IN: 0 ml / OUT: 250 ml / NET: -250 ml        PHYSICAL EXAM:  Constitutional: Mildly pale, Appears stated age, sitting up in bed and in no distress  Eyes:  EOMI,  Pupils round, No oral cyanosis.  Pulmonary: Non-labored, breath sounds and no wheezing, fine crackles in right base   Cardiovascular: S1 and S2, regular rate and rhythm, III/VI systolic murmur at base radiating to carotid  Gastrointestinal: Bowel Sounds present, soft, nontender.   Lymph: No peripheral edema. No cervical lymphadenopathy.  Neurological: Alert, no focal deficits  Skin: Warm, dry.  Psych:  Appears somewhat withdrawn; otherwise mood & affect appropriate    LABS:                   11.1                         11.1   7.1   )-----------( 252      ( 17 May 2017 06:38 )             32.6     05-17    141  |  104  |  32<H>  ----------------------------<  122<H>  3.6   |  28  |  0.86    Ca    8.8      17 May 2017 06:38    TPro  6.8  /  Alb  2.9<L>  /  TBili  0.5  /  DBili  x   /  AST  27  /  ALT  22  /  AlkPhos  98  05-16    CARDIAC MARKERS ( 17 May 2017 06:38 )  .038 ng/mL / x     / x     / x     / x      CARDIAC MARKERS ( 16 May 2017 18:10 )  .066 ng/mL / x     / x     / x     / x      CARDIAC MARKERS ( 16 May 2017 12:46 )  .065 ng/mL / x     / 81 U/L / x     / 1.6 ng/mL      LIVER FUNCTIONS - ( 16 May 2017 12:46 )  Alb: 2.9 g/dL / Pro: 6.8 g/dL / ALK PHOS: 98 U/L / ALT: 22 U/L DA / AST: 27 U/L / GGT: x           PT/INR - ( 17 May 2017 11:59 )   PT: 11.5 sec;   INR: 1.05 ratio               LIVER FUNCTIONS - ( 16 May 2017 12:46 )  Alb: 2.9 g/dL / Pro: 6.8 g/dL / ALK PHOS: 98 U/L / ALT: 22 U/L DA / AST: 27 U/L / GGT: x               05-16 @ 12:46 Pro Bnp [id number]    dig level 5/17 : 2.3    CXR: A soft tissue mass is present in the left upper lung measuring 6 cm maximum span. Findings suspicious for the presence of a neoplasm. The right hemidiaphragm is flattened, an emphysematous change. No effusion. No vascular congestion.     ECG: AF, PVC, electronic ventricular pacing    Echo:  PROCEDURE DATE:  05/16/2017        INTERPRETATION:  Ordering Physician: RENEE ROGEL [id number]    Indication: Aortic valve disease    Technician: Marbella Cameron    Study Quality: Fair   A complete transthoracic echocardiographic study was performed utilizing   standard protocol including spectral and color Doppler in all   echocardiographic windows.    Height: 69 inches  Weight: 126 pounds  BSA: 1.7 sq m  Blood Pressure:148/76 mmHg    MEASUREMENTS(CM)  IVS: 1.3  PWT: 1.3  LA: 6.7  AO: 3.9  LVIDd: 6.0  LVIDs: 4.5  LVOT: 2.1    LVEF: 50-55%  RVSP: 103 mmHg  RA Pressure: 15 mmHg  IVC: Greater than 2 cm and does not collapse with respiration    FINDINGS  Left Ventricle:The left ventricle is dilated and hypertrophied with low   normal left ventricular systolic function. No focal wall motion   abnormalities are seen. Approximate ejection fraction is 50-55%.  Right Ventricle: The right ventricle is at the upper limit of normal in   size with preserved right ventricular systolic function.  Left Atrium: The left atrium is severely dilated  Right Atrium: The right atrium is moderately dilated  Mitral Valve: The mitral valve is mildly thickened with mitral annular   calcification present. Severe mitral insufficiency is noted. No mitral   stenosis is seen.  Aortic Valve: The aortic valve is moderate to severely thickened with   moderate limitation and leaflet excursion. Moderate aortic stenosis is   noted. A peak gradient of 38 mmHg and a mean gradient of 20 mmHg is   noted. The estimated aortic valve area is 1.0 sq cm. Moderate to severe   aortic insufficiency is noted.  Tricuspid Valve: The tricuspid valve is mildly thickened with normal   leaflet excursion. Severe tricuspid insufficiency is noted. No tricuspid   stenosis is seen. The estimated right ventricular systolic pressure is   approximately 103 mmHg consistent with severe pulmonary hypertension  Pulmonic Valve: The pulmonic valve is mildly thickened with normal   leaflet excursion. Trace insufficiency and no stenosis is seen.  Diastolic Function: A diastolic relaxation abnormality is noted   consistent with reduced compliance of the left ventricle  Pericardium/Pleura: The pericardium is normalin thickness. A trace to   mild pericardial effusion is noted. No echocardiographic evidence of   tamponade is seen. No pleural effusion is noted      CONCLUSIONS:  Low normal left ventricular systolic function with an approximate   ejection fractionof 50-55%.  A dilated and hypertrophied left ventricle.  Biatrial enlargement.  Severe mitral insufficiency.  Moderate aortic stenosis with moderate to severe aortic insufficiency.  Severe tricuspid insufficiency with severe pulmonary hypertension.  Mild to moderate diastolic dysfunction.  A trace to mild pericardial effusion.    Monitor  .Atrial fibrillation with demand PPM

## 2017-05-18 NOTE — DISCHARGE NOTE ADULT - MEDICATION SUMMARY - MEDICATIONS TO STOP TAKING
I will STOP taking the medications listed below when I get home from the hospital:    digoxin  -- 0.25 milligram(s) by mouth once a day

## 2017-05-18 NOTE — DISCHARGE NOTE ADULT - PLAN OF CARE
breathe better slow steroid taper  oxygen via nasal cannula as needed.   continue inhalers  f/u with Dr Cleary medical management per cardiology  monitor lasix was on 0.25mg daily, on admission level was 2.8  restart at lower dose when level <1.5  monitor digoxin levels. f/u with oncology was on 0.25mg daily, on admission level was 2.8, last level was 1.4  as per cardiology - discontinue for now and follow in office.

## 2017-05-18 NOTE — DISCHARGE NOTE ADULT - CARE PROVIDERS DIRECT ADDRESSES
,DirectAddress_Unknown,DirectAddress_Unknown,[email or web address],DirectAddress_Unknown ,DirectAddress_Unknown,[email or web address],DirectAddress_Unknown,DirectAddress_Unknown

## 2017-05-18 NOTE — DISCHARGE NOTE ADULT - MEDICATION SUMMARY - MEDICATIONS TO TAKE
I will START or STAY ON the medications listed below when I get home from the hospital:    predniSONE 20 mg oral tablet  -- 1 tab(s) by mouth 2 times a day  -- taper slowly.  -- Indication: For COPD (chronic obstructive pulmonary disease) with emphysema    budesonide 0.5 mg/2 mL inhalation suspension  -- 1  inhaled 2 times a day  -- Indication: For COPD (chronic obstructive pulmonary disease) with emphysema    aspirin 81 mg oral delayed release tablet  -- 1 tab(s) by mouth once a day  -- Indication: For Cad    oxyCODONE 40 mg oral tablet, extended release  -- 1 tab(s) by mouth every 8 hours  -- Indication: For Chronic pain    oxyCODONE 5 mg oral tablet  -- 1 tab(s) by mouth every 4 hours, As needed, Moderate Pain (4 - 6)  -- Indication: For Chronic pain    oxyCODONE 10 mg oral tablet  -- 1 tab(s) by mouth every 4 hours, As needed, Severe Pain (7 - 10)  -- Indication: For Chronic pain    aluminum hydroxide-magnesium hydroxide 200 mg-200 mg/5 mL oral suspension  -- 30 milliliter(s) by mouth every 6 hours, As needed, Dyspepsia  -- Indication: For Heartburn    magnesium hydroxide 8% oral suspension  -- 30 milliliter(s) by mouth once a day, As needed, Constipation  -- Indication: For COnstipation    alfuzosin 10 mg oral tablet, extended release  -- 1 tab(s) by mouth once a day  -- Indication: For bph    dilTIAZem 180 mg/24 hours oral tablet, extended release  -- 1 tab(s) by mouth once a day  -- Indication: For Atrial fibrillation, unspecified type    heparin  -- 5000 unit(s) subcutaneous 2 times a day  -- Indication: For Dvt proph    simvastatin 40 mg oral tablet  -- 1 tab(s) by mouth once a day (at bedtime)  -- Indication: For Hyperlipidemia    metoprolol tartrate 25 mg oral tablet  -- 1 tab(s) by mouth 2 times a day  -- Indication: For Atrial fibrillation, unspecified type    albuterol-ipratropium 2.5 mg-0.5 mg/3 mL inhalation solution  -- 3 milliliter(s) inhaled every 6 hours  -- Indication: For COPD (chronic obstructive pulmonary disease) with emphysema    albuterol 2.5 mg/3 mL (0.083%) inhalation solution  -- 1  inhaled every 2 hours, As Needed  -- for shortness of breath  -- Indication: For COPD (chronic obstructive pulmonary disease) with emphysema    triamcinolone 0.1% topical cream  -- 1 application on skin every 12 hours  -- Indication: For Psoriasis    ferrous sulfate 325 mg (65 mg elemental iron) oral delayed release tablet  -- 1 tab(s) by mouth once a day  -- Indication: For vitamin    docusate sodium 100 mg oral capsule  -- 1 cap(s) by mouth 3 times a day  -- Indication: For Stool softener

## 2017-05-18 NOTE — PROGRESS NOTE ADULT - SUBJECTIVE AND OBJECTIVE BOX
Patient is a 86y old  Male who presents with a chief complaint of Patient complaining of SOB, requesting oxygen (16 May 2017 17:02)      INTERVAL HPI/OVERNIGHT EVENTS: Was ok this morning, felt well when walking with PT but now is sitting in the chair complaining of dyspnea.  Denies chest pain and palpitations. No cough or sputum production. Reports feeling constipated - no BM in 4-5 days.     MEDICATIONS  (STANDING):  ALBUTerol/ipratropium for Nebulization 3milliLiter(s) Nebulizer every 6 hours  heparin  Injectable 5000Unit(s) SubCutaneous every 12 hours  aspirin enteric coated 81milliGRAM(s) Oral daily  oxyCODONE ER Tablet 40milliGRAM(s) Oral every 8 hours  doxazosin 4milliGRAM(s) Oral at bedtime  diltiazem   CD 180milliGRAM(s) Oral daily  simvastatin 40milliGRAM(s) Oral at bedtime  metoprolol 25milliGRAM(s) Oral two times a day  ferrous    sulfate 325milliGRAM(s) Oral daily  buDESOnide   0.5 milliGRAM(s) Respule 0.5milliGRAM(s) Inhalation two times a day  triamcinolone 0.1% Cream 1Application(s) Topical every 12 hours  furosemide    Tablet 20milliGRAM(s) Oral daily  predniSONE   Tablet 20milliGRAM(s) Oral two times a day    MEDICATIONS  (PRN):  oxyCODONE IR 5milliGRAM(s) Oral every 4 hours PRN Moderate Pain (4 - 6)  oxyCODONE IR 10milliGRAM(s) Oral every 4 hours PRN Severe Pain (7 - 10)  ALBUTerol    0.083% 2.5milliGRAM(s) Nebulizer every 2 hours PRN Shortness of Breath and/or Wheezing      Allergies    clindamycin (Unknown)  GoLYTELY (Unknown)  Levaquin (Pruritus; Rash)  P O Contrast (Unknown)  penicillin (Rash)      REVIEW OF SYSTEMS:  CONSTITUTIONAL: No fever, weight loss, or fatigue  EYES: No eye pain, visual disturbances, or discharge  ENMT:  No difficulty hearing, tinnitus, vertigo; No sinus or throat pain  NECK: No pain or stiffness  BREASTS: No pain, masses, or nipple discharge  RESPIRATORY: see hpi  CARDIOVASCULAR: No chest pain, palpitations, dizziness, or leg swelling  GASTROINTESTINAL: No abdominal or epigastric pain. No nausea, vomiting, or hematemesis; No melena or hematochezia.  GENITOURINARY: No dysuria, frequency, hematuria, or incontinence  NEUROLOGICAL: No headaches, memory loss, loss of strength, numbness, or tremors  SKIN: No itching, burning, rashes, or lesions   LYMPH NODES: No enlarged glands  ENDOCRINE: No heat or cold intolerance; No hair loss; No polydipsia or polyuria  MUSCULOSKELETAL: +chronic pain  PSYCHIATRIC: No depression, anxiety, mood swings, or difficulty sleeping  HEME/LYMPH: No easy bruising, or bleeding gums  ALLERGY AND IMMUNOLOGIC: No hives or eczema    Vital Signs Last 24 Hrs  T(C): 36.9, Max: 36.9 (05-18 @ 09:47)  T(F): 98.4, Max: 98.4 (05-18 @ 09:47)  HR: 69 (60 - 71)  BP: 144/68 (112/62 - 144/68)  BP(mean): --  RR: 18 (17 - 20)  SpO2: 96% (92% - 100%)    PHYSICAL EXAM:  GENERAL: NAD, well-groomed, +cacechtic  HEAD:  Atraumatic, Normocephalic  EYES: conjunctiva and sclera clear  ENMT:  Moist mucous membranes,   NECK: Supple,  NERVOUS SYSTEM:  awake and alert, oriented x3; moving all 4 extremities.  No gross sensory losses.   CHEST/LUNG: Clear to auscultation bilaterally; general decreased air entry but no overt change since yesterday.   HEART: Regular rate and rhythm;   ABDOMEN: Soft, Nontender, Nondistended; Bowel sounds present  EXTREMITIES:  2+ Peripheral Pulses, No clubbing, cyanosis, or edema  LYMPH: No lymphadenopathy noted  SKIN: No rashes or lesions    LABS:      Ca    8.8        17 May 2017 06:38      PT/INR - ( 17 May 2017 11:59 )   PT: 11.5 sec;   INR: 1.05 ratio             CAPILLARY BLOOD GLUCOSE      RADIOLOGY & ADDITIONAL TESTS:    Imaging Personally Reviewed:  [ ] YES     Consultant(s) Notes Reviewed:  pulm    Care Discussed with Consultants/Other Providers: Dr Palla today, Dr reina yesterday.

## 2017-05-18 NOTE — PROGRESS NOTE ADULT - PROBLEM SELECTOR PLAN 3
HR in 60's and paced.  Will continue to monitor.  Continue to hold digoxin. Level 2.3 on labs. resume at  lower dose when levels falls below 1.5

## 2017-05-18 NOTE — PROGRESS NOTE ADULT - SUBJECTIVE AND OBJECTIVE BOX
Patient seen and examined today Plan discussed/Questions answered  (with patient/ancillary staff/colleagues) Chart notes reviewd More detailed Pulm/Critical Care notes, assessment and plan  will be added later today as needed after reviewing further labs as they become available     Notable interval events reviewed co dyspnea abg and cxr ordered    ROS/PE  . REVIEW OF SYMPTOMS    Able to give ROS  Yes     RELIABLE NO   Weakness Yes   Chills No   Vision changes No  Lymph nodes No enlarged glands   Endocrine No unexplained hair loss No het or cold intolerance   Allergy No hives   Sore throat No   Coughing blood No  Headache No  Confusion YES  Chest pain No  Palpitations No   Pain abdomen NO   Shortness of breath YES  Vomiting NO  Pain neck No  Pain abdomen NO       PHYSICAL EXAM  frail   HEENT Unremarkable PERRLA atraumatic  RESP Fair air entry EXP prolonged    Harsh breath sound Resp distres mild  CARDIAC S1 S2 No S3     NO JVD   Awake Alert and ORIENTED x tw  ABDOMEN SOFT BS PRESENT NOT DISTENDED No hepatosplenomegaly  PEDAL EDEMA present No calf tenderness  NO rash GENERAL Not TOXIC looking  . Patient seen and examined today Plan discussed/Questions answered  (with patient/ancillary staff/colleagues) Chart notes reviewd More detailed Pulm/Critical Care notes, assessment and plan  will be added later today as needed after reviewing further labs as they become available     Notable interval events reviewed co dyspnea abg and cxr ordered    ROS/PE  . REVIEW OF SYMPTOMS    Able to give ROS  Yes     RELIABLE NO   Weakness Yes   Chills No   Vision changes No  Lymph nodes No enlarged glands   Endocrine No unexplained hair loss No het or cold intolerance   Allergy No hives   Sore throat No   Coughing blood No  Headache No  Confusion YES  Chest pain No  Palpitations No   Pain abdomen NO   Shortness of breath YES  Vomiting NO  Pain neck No  Pain abdomen NO       PHYSICAL EXAM  frail   HEENT Unremarkable PERRLA atraumatic  RESP Fair air entry EXP prolonged    Harsh breath sound Resp distres mild  CARDIAC S1 S2 No S3     NO JVD   Awake Alert and ORIENTED x tw  ABDOMEN SOFT BS PRESENT NOT DISTENDED No hepatosplenomegaly  PEDAL EDEMA present No calf tenderness  NO rash GENERAL Not TOXIC looking  . VITALS/LABS  5/18/2017 afeb 69 140/68 18 96%   5/17/2017 W 7.1 Hb 11.1 Plt 252  Na 141 K 3.6 CO2 28 Cr .8   PATIENT DESCRIPTION  86 year old man HO passive smoking with a history of CAD s/p PCI (LAD 2012, RCA 2010), atrial fibrillation, PPM, COPD, GI bleed (recurrent), carotid artery disease, HTN, HLD, aortic stenosis (mild in 2014), was admitted [facility] 5/16/2017 with  dyspnea and was found to have lung mass and had CTNB by IR 5/17 He was noted to be digitoxic and dig was held and was found to have severe pulmonary hypertension which will be further worked up after lung mass evaluation is completed  IMPRESSION PLAN   RESPIRATORY STATUS On O2 PO acceptable   5/18 6a .3 739/47/93 Gas exchange acceptable  POST CTNB PNEUMOTHORAX   5/18 a ABG gas exchange acceptable  5/18 CXR Question tiny apical ptx nsc Monitor serial CXR   LUNG MASS 5/16 CT Ch MARILIA Mass 6.5x7x4.7 extends pleural surface to suprahilar region  Await hpe cyto  COPD Duoneb (5/16) Pulmicort (5/16) Pred 20.2 (5/18)   DIGITOXICITY 5/17 Dig 2.3 Off dig   A FIB On ASA 81 (5/16) metoprolol 25.2 (5/16)   CHF On Lasix 20 (5/17) Compensated  GLOBAL ISSUE/BEST PRACTICE:        PROBLEM:      Analgesia:     na                        PROBLEM: Sedation:     na               PROBLEM: HOB elevation:   yes             PROBLEM: Stress ulcer proph:    na                      PROBLEM: VTE prophylaxis:      hsc (5/16)                        PROBLEM: Glycemic control:    na            PROBLEM: Nutrition:    Reg Enlive .3 (5/16)                       PROBLEM: Advanced directive: na     PROBLEM: Allergies:  na  PULMONARY RECOMMENDATIONS  COPD On BD steroids    PNEUMOTHORAX Monitor serial CXR   LUNG MASS Await cyto  A FIB On rate control agents                                                     TIME SPENT Over 25 minutes aggregate time spent on encounter; activities included   direct patient care, counseling and/or coordinating care reviewing notes, labs data/ imaging , discussion with multidisciplinary team.  Plan of care discussed with patient  and/family in detail who expressed understanding of the management plan . Risks, benefits, alternatives  discussed in detail. Questions/concerns  were addressed  to the best of my ability .

## 2017-05-18 NOTE — DISCHARGE NOTE ADULT - SECONDARY DIAGNOSIS.
Chronic diastolic congestive heart failure Digoxin overdose, accidental or unintentional, initial encounter Adenocarcinoma of left lung

## 2017-05-18 NOTE — GOALS OF CARE CONVERSATION - PERSONAL ADVANCE DIRECTIVE - CONVERSATION DETAILS
Pt completed HCP,wants all done full code
spoke to pt with his daughter at bedside about resuscitation . pt states he wants cpr attempted if necessary.

## 2017-05-19 VITALS
TEMPERATURE: 98 F | SYSTOLIC BLOOD PRESSURE: 134 MMHG | DIASTOLIC BLOOD PRESSURE: 73 MMHG | RESPIRATION RATE: 20 BRPM | HEART RATE: 69 BPM | OXYGEN SATURATION: 95 %

## 2017-05-19 DIAGNOSIS — C34.92 MALIGNANT NEOPLASM OF UNSPECIFIED PART OF LEFT BRONCHUS OR LUNG: ICD-10-CM

## 2017-05-19 LAB
ANION GAP SERPL CALC-SCNC: 4 MMOL/L — LOW (ref 5–17)
BUN SERPL-MCNC: 57 MG/DL — HIGH (ref 7–23)
CALCIUM SERPL-MCNC: 9.4 MG/DL — SIGNIFICANT CHANGE UP (ref 8.4–10.5)
CHLORIDE SERPL-SCNC: 103 MMOL/L — SIGNIFICANT CHANGE UP (ref 96–108)
CO2 SERPL-SCNC: 32 MMOL/L — HIGH (ref 22–31)
CREAT SERPL-MCNC: 1.17 MG/DL — SIGNIFICANT CHANGE UP (ref 0.5–1.3)
DIGOXIN SERPL-MCNC: 1.4 NG/ML — SIGNIFICANT CHANGE UP (ref 0.8–2)
GLUCOSE SERPL-MCNC: 124 MG/DL — HIGH (ref 70–99)
HCT VFR BLD CALC: 31.5 % — LOW (ref 39–50)
HGB BLD-MCNC: 10.4 G/DL — LOW (ref 13–17)
MAGNESIUM SERPL-MCNC: 2.7 MG/DL — HIGH (ref 1.6–2.6)
MCHC RBC-ENTMCNC: 30 PG — SIGNIFICANT CHANGE UP (ref 27–34)
MCHC RBC-ENTMCNC: 33.2 GM/DL — SIGNIFICANT CHANGE UP (ref 32–36)
MCV RBC AUTO: 90.4 FL — SIGNIFICANT CHANGE UP (ref 80–100)
PLATELET # BLD AUTO: 231 K/UL — SIGNIFICANT CHANGE UP (ref 150–400)
POTASSIUM SERPL-MCNC: 4.4 MMOL/L — SIGNIFICANT CHANGE UP (ref 3.5–5.3)
POTASSIUM SERPL-SCNC: 4.4 MMOL/L — SIGNIFICANT CHANGE UP (ref 3.5–5.3)
RBC # BLD: 3.48 M/UL — LOW (ref 4.2–5.8)
RBC # FLD: 15 % — HIGH (ref 10.3–14.5)
SODIUM SERPL-SCNC: 139 MMOL/L — SIGNIFICANT CHANGE UP (ref 135–145)
WBC # BLD: 15 K/UL — HIGH (ref 3.8–10.5)
WBC # FLD AUTO: 15 K/UL — HIGH (ref 3.8–10.5)

## 2017-05-19 PROCEDURE — 93306 TTE W/DOPPLER COMPLETE: CPT

## 2017-05-19 PROCEDURE — 71045 X-RAY EXAM CHEST 1 VIEW: CPT

## 2017-05-19 PROCEDURE — 97530 THERAPEUTIC ACTIVITIES: CPT

## 2017-05-19 PROCEDURE — 71250 CT THORAX DX C-: CPT

## 2017-05-19 PROCEDURE — 93005 ELECTROCARDIOGRAM TRACING: CPT

## 2017-05-19 PROCEDURE — 83880 ASSAY OF NATRIURETIC PEPTIDE: CPT

## 2017-05-19 PROCEDURE — 80053 COMPREHEN METABOLIC PANEL: CPT

## 2017-05-19 PROCEDURE — 82550 ASSAY OF CK (CPK): CPT

## 2017-05-19 PROCEDURE — 96372 THER/PROPH/DIAG INJ SC/IM: CPT | Mod: XU

## 2017-05-19 PROCEDURE — 80162 ASSAY OF DIGOXIN TOTAL: CPT

## 2017-05-19 PROCEDURE — 85027 COMPLETE CBC AUTOMATED: CPT

## 2017-05-19 PROCEDURE — 70450 CT HEAD/BRAIN W/O DYE: CPT

## 2017-05-19 PROCEDURE — 36415 COLL VENOUS BLD VENIPUNCTURE: CPT

## 2017-05-19 PROCEDURE — 96374 THER/PROPH/DIAG INJ IV PUSH: CPT

## 2017-05-19 PROCEDURE — 97161 PT EVAL LOW COMPLEX 20 MIN: CPT

## 2017-05-19 PROCEDURE — 88305 TISSUE EXAM BY PATHOLOGIST: CPT

## 2017-05-19 PROCEDURE — 83735 ASSAY OF MAGNESIUM: CPT

## 2017-05-19 PROCEDURE — 82553 CREATINE MB FRACTION: CPT

## 2017-05-19 PROCEDURE — 94640 AIRWAY INHALATION TREATMENT: CPT

## 2017-05-19 PROCEDURE — 80048 BASIC METABOLIC PNL TOTAL CA: CPT

## 2017-05-19 PROCEDURE — 99239 HOSP IP/OBS DSCHRG MGMT >30: CPT

## 2017-05-19 PROCEDURE — 32405: CPT

## 2017-05-19 PROCEDURE — 70450 CT HEAD/BRAIN W/O DYE: CPT | Mod: 26

## 2017-05-19 PROCEDURE — 97116 GAIT TRAINING THERAPY: CPT

## 2017-05-19 PROCEDURE — 84484 ASSAY OF TROPONIN QUANT: CPT

## 2017-05-19 PROCEDURE — 85610 PROTHROMBIN TIME: CPT

## 2017-05-19 PROCEDURE — 82803 BLOOD GASES ANY COMBINATION: CPT

## 2017-05-19 PROCEDURE — 77012 CT SCAN FOR NEEDLE BIOPSY: CPT

## 2017-05-19 PROCEDURE — 99233 SBSQ HOSP IP/OBS HIGH 50: CPT

## 2017-05-19 PROCEDURE — 99285 EMERGENCY DEPT VISIT HI MDM: CPT | Mod: 25

## 2017-05-19 RX ORDER — HEPARIN SODIUM 5000 [USP'U]/ML
5000 INJECTION INTRAVENOUS; SUBCUTANEOUS
Qty: 0 | Refills: 0 | COMMUNITY
Start: 2017-05-19

## 2017-05-19 RX ORDER — BUDESONIDE, MICRONIZED 100 %
1 POWDER (GRAM) MISCELLANEOUS
Qty: 0 | Refills: 0 | COMMUNITY
Start: 2017-05-19

## 2017-05-19 RX ORDER — DIGOXIN 250 MCG
0.25 TABLET ORAL
Qty: 0 | Refills: 0 | COMMUNITY

## 2017-05-19 RX ORDER — MAGNESIUM HYDROXIDE 400 MG/1
30 TABLET, CHEWABLE ORAL
Qty: 0 | Refills: 0 | COMMUNITY
Start: 2017-05-19

## 2017-05-19 RX ORDER — OXYCODONE HYDROCHLORIDE 5 MG/1
1 TABLET ORAL
Qty: 0 | Refills: 0 | COMMUNITY
Start: 2017-05-19

## 2017-05-19 RX ORDER — IPRATROPIUM/ALBUTEROL SULFATE 18-103MCG
3 AEROSOL WITH ADAPTER (GRAM) INHALATION
Qty: 0 | Refills: 0 | COMMUNITY
Start: 2017-05-19

## 2017-05-19 RX ORDER — ASPIRIN/CALCIUM CARB/MAGNESIUM 324 MG
1 TABLET ORAL
Qty: 0 | Refills: 0 | COMMUNITY
Start: 2017-05-19

## 2017-05-19 RX ORDER — ALBUTEROL 90 UG/1
1 AEROSOL, METERED ORAL
Qty: 0 | Refills: 0 | COMMUNITY
Start: 2017-05-19

## 2017-05-19 RX ORDER — METOPROLOL TARTRATE 50 MG
1 TABLET ORAL
Qty: 0 | Refills: 0 | COMMUNITY
Start: 2017-05-19

## 2017-05-19 RX ORDER — DOCUSATE SODIUM 100 MG
1 CAPSULE ORAL
Qty: 0 | Refills: 0 | COMMUNITY
Start: 2017-05-19

## 2017-05-19 RX ADMIN — Medication 3 MILLILITER(S): at 12:41

## 2017-05-19 RX ADMIN — Medication 1 APPLICATION(S): at 05:56

## 2017-05-19 RX ADMIN — Medication 20 MILLIGRAM(S): at 05:54

## 2017-05-19 RX ADMIN — OXYCODONE HYDROCHLORIDE 10 MILLIGRAM(S): 5 TABLET ORAL at 17:00

## 2017-05-19 RX ADMIN — Medication 3 MILLILITER(S): at 07:11

## 2017-05-19 RX ADMIN — OXYCODONE HYDROCHLORIDE 10 MILLIGRAM(S): 5 TABLET ORAL at 15:56

## 2017-05-19 RX ADMIN — HEPARIN SODIUM 5000 UNIT(S): 5000 INJECTION INTRAVENOUS; SUBCUTANEOUS at 05:54

## 2017-05-19 RX ADMIN — Medication 180 MILLIGRAM(S): at 05:55

## 2017-05-19 RX ADMIN — Medication 325 MILLIGRAM(S): at 12:00

## 2017-05-19 RX ADMIN — Medication 0.5 MILLIGRAM(S): at 07:10

## 2017-05-19 RX ADMIN — Medication 25 MILLIGRAM(S): at 05:55

## 2017-05-19 RX ADMIN — Medication 81 MILLIGRAM(S): at 12:00

## 2017-05-19 RX ADMIN — Medication 100 MILLIGRAM(S): at 13:33

## 2017-05-19 RX ADMIN — Medication 30 MILLILITER(S): at 16:20

## 2017-05-19 RX ADMIN — Medication 100 MILLIGRAM(S): at 05:54

## 2017-05-19 RX ADMIN — OXYCODONE HYDROCHLORIDE 10 MILLIGRAM(S): 5 TABLET ORAL at 09:02

## 2017-05-19 NOTE — PROGRESS NOTE ADULT - PROBLEM SELECTOR PROBLEM 1
Lung mass
Lung mass
SOB (shortness of breath)
Atrial fibrillation, unspecified type
Lung mass
Atrial fibrillation, unspecified type

## 2017-05-19 NOTE — PROGRESS NOTE ADULT - PROBLEM SELECTOR PROBLEM 6
Aortic valve disease
Anemia
Cachexia
Aortic valve disease
Aortic valve disease
Prophylactic measure
Anemia
Prophylactic measure

## 2017-05-19 NOTE — PROGRESS NOTE ADULT - PROVIDER SPECIALTY LIST ADULT
Cardiology
Hospitalist
Hospitalist
Pulmonology
Hospitalist

## 2017-05-19 NOTE — PROGRESS NOTE ADULT - SUBJECTIVE AND OBJECTIVE BOX
Patient is a 86y old  Male who presents with a chief complaint of Patient complaining of SOB, requesting oxygen (18 May 2017 16:04)      INTERVAL HPI/OVERNIGHT EVENTS: patient reports shortness of breath is better, chronic pain is controlled, but is now complaining of heartburn.     MEDICATIONS  (STANDING):  ALBUTerol/ipratropium for Nebulization 3milliLiter(s) Nebulizer every 6 hours  heparin  Injectable 5000Unit(s) SubCutaneous every 12 hours  aspirin enteric coated 81milliGRAM(s) Oral daily  oxyCODONE ER Tablet 40milliGRAM(s) Oral every 8 hours  doxazosin 4milliGRAM(s) Oral at bedtime  diltiazem   CD 180milliGRAM(s) Oral daily  simvastatin 40milliGRAM(s) Oral at bedtime  metoprolol 25milliGRAM(s) Oral two times a day  ferrous    sulfate 325milliGRAM(s) Oral daily  buDESOnide   0.5 milliGRAM(s) Respule 0.5milliGRAM(s) Inhalation two times a day  triamcinolone 0.1% Cream 1Application(s) Topical every 12 hours  furosemide    Tablet 20milliGRAM(s) Oral daily  predniSONE   Tablet 20milliGRAM(s) Oral two times a day  docusate sodium 100milliGRAM(s) Oral three times a day    MEDICATIONS  (PRN):  oxyCODONE IR 5milliGRAM(s) Oral every 4 hours PRN Moderate Pain (4 - 6)  oxyCODONE IR 10milliGRAM(s) Oral every 4 hours PRN Severe Pain (7 - 10)  ALBUTerol    0.083% 2.5milliGRAM(s) Nebulizer every 2 hours PRN Shortness of Breath and/or Wheezing  magnesium hydroxide Suspension 30milliLiter(s) Oral daily PRN Constipation  aluminum hydroxide/magnesium hydroxide/simethicone Suspension 30milliLiter(s) Oral every 6 hours PRN Dyspepsia      Allergies    clindamycin (Unknown)  GoLYTELY (Unknown)  Levaquin (Pruritus; Rash)  P O Contrast (Unknown)  penicillin (Rash)          REVIEW OF SYSTEMS:  CONSTITUTIONAL: No fever, or fatigue  EYES: No eye pain, visual disturbances, or discharge  ENMT:  No difficulty hearing, tinnitus, vertigo; No sinus or throat pain  NECK: No pain or stiffness  BREASTS: No pain, masses, or nipple discharge  RESPIRATORY: see hpi  CARDIOVASCULAR: No chest pain, palpitations, dizziness, or leg swelling  GASTROINTESTINAL: constipation due to opioids  GENITOURINARY: No dysuria, frequency, hematuria, or incontinence  NEUROLOGICAL: No headaches, memory loss, loss of strength, numbness, or tremors  SKIN: No itching, burning, rashes, or lesions   LYMPH NODES: No enlarged glands  ENDOCRINE: No heat or cold intolerance; No hair loss; No polydipsia or polyuria  MUSCULOSKELETAL: chronic leg pain  PSYCHIATRIC: No depression, anxiety, mood swings, or difficulty sleeping  HEME/LYMPH: No easy bruising, or bleeding gums  ALLERGY AND IMMUNOLOGIC: No hives or eczema    Vital Signs Last 24 Hrs  T(C): 36.6, Max: 36.6 (05-18 @ 17:08)  T(F): 97.8, Max: 97.9 (05-19 @ 01:22)  HR: 61 (52 - 66)  BP: 151/65 (127/66 - 151/65)  BP(mean): --  RR: 18 (18 - 20)  SpO2: 98% (96% - 99%)    PHYSICAL EXAM:  GENERAL: NAD, well-groomed, well-developed  HEAD:  Atraumatic, Normocephalic  EYES:  conjunctiva and sclera clear  ENMT: ; Moist mucous membranes,   NECK: Supple, No JVD, Normal thyroid  NERVOUS SYSTEM:  Alert & Oriented X2, forgetful at times  CHEST/LUNG: Clear to auscultation bilaterally; No rales, rhonchi, wheezing, or rubs  HEART: Regular rate and rhythm; No murmurs, rubs, or gallops  ABDOMEN: Soft, Nontender, Nondistended; Bowel sounds present  EXTREMITIES:  2+ Peripheral Pulses, No clubbing, cyanosis, or edema  SKIN: No rashes or lesions    LABS:                        10.4   15.0  )-----------( 231      ( 19 May 2017 06:40 )             31.5     19 May 2017 06:40    139    |  103    |  57     ----------------------------<  124    4.4     |  32     |  1.17     Ca    9.4        19 May 2017 06:40  Mg     2.7       19 May 2017 06:40          CAPILLARY BLOOD GLUCOSE      RADIOLOGY & ADDITIONAL TESTS:  Head CT: Generalized age-appropriate parenchymal volume loss without   disproportionate ventriculomegaly. Mild to moderate chronic ischemic   gliotic cerebral hemisphere white matter changes. Bilateral cerebellar   dentate nuclei and bilateral basal ganglia calcifications. There is no   edema, intra-axial or extra-axial hemorrhage or acute territorial   infarct. No mass effect. Vascular calcification. Sinuses clear. No   calvarial destructive lesions. Multilevel bilateral nonspecific small   calcified scalp nodules.  Imaging Personally Reviewed:  [x ] YES     Consultant(s) Notes Reviewed:      Care Discussed with Consultants/Other Providers: Dr Cleary, Dr De Jesus. Patient is a 86y old  Male who presents with a chief complaint of Patient complaining of SOB, requesting oxygen (18 May 2017 16:04)      INTERVAL HPI/OVERNIGHT EVENTS: patient reports shortness of breath is better, chronic pain is controlled, but is now complaining of heartburn.     MEDICATIONS  (STANDING):  ALBUTerol/ipratropium for Nebulization 3milliLiter(s) Nebulizer every 6 hours  heparin  Injectable 5000Unit(s) SubCutaneous every 12 hours  aspirin enteric coated 81milliGRAM(s) Oral daily  oxyCODONE ER Tablet 40milliGRAM(s) Oral every 8 hours  doxazosin 4milliGRAM(s) Oral at bedtime  diltiazem   CD 180milliGRAM(s) Oral daily  simvastatin 40milliGRAM(s) Oral at bedtime  metoprolol 25milliGRAM(s) Oral two times a day  ferrous    sulfate 325milliGRAM(s) Oral daily  buDESOnide   0.5 milliGRAM(s) Respule 0.5milliGRAM(s) Inhalation two times a day  triamcinolone 0.1% Cream 1Application(s) Topical every 12 hours  furosemide    Tablet 20milliGRAM(s) Oral daily  predniSONE   Tablet 20milliGRAM(s) Oral two times a day  docusate sodium 100milliGRAM(s) Oral three times a day    MEDICATIONS  (PRN):  oxyCODONE IR 5milliGRAM(s) Oral every 4 hours PRN Moderate Pain (4 - 6)  oxyCODONE IR 10milliGRAM(s) Oral every 4 hours PRN Severe Pain (7 - 10)  ALBUTerol    0.083% 2.5milliGRAM(s) Nebulizer every 2 hours PRN Shortness of Breath and/or Wheezing  magnesium hydroxide Suspension 30milliLiter(s) Oral daily PRN Constipation  aluminum hydroxide/magnesium hydroxide/simethicone Suspension 30milliLiter(s) Oral every 6 hours PRN Dyspepsia      Allergies    clindamycin (Unknown)  GoLYTELY (Unknown)  Levaquin (Pruritus; Rash)  P O Contrast (Unknown)  penicillin (Rash)          REVIEW OF SYSTEMS:  CONSTITUTIONAL: No fever, or fatigue  EYES: No eye pain, visual disturbances, or discharge  ENMT:  No difficulty hearing, tinnitus, vertigo; No sinus or throat pain  NECK: No pain or stiffness  BREASTS: No pain, masses, or nipple discharge  RESPIRATORY: see hpi  CARDIOVASCULAR: No chest pain, palpitations, dizziness, or leg swelling  GASTROINTESTINAL: constipation due to opioids  GENITOURINARY: No dysuria, frequency, hematuria, or incontinence  NEUROLOGICAL: No headaches, memory loss, loss of strength, numbness, or tremors  SKIN: No itching, burning, rashes, or lesions   LYMPH NODES: No enlarged glands  ENDOCRINE: No heat or cold intolerance; No hair loss; No polydipsia or polyuria  MUSCULOSKELETAL: chronic leg pain  PSYCHIATRIC: No depression, anxiety, mood swings, or difficulty sleeping  HEME/LYMPH: No easy bruising, or bleeding gums  ALLERGY AND IMMUNOLOGIC: No hives or eczema    Vital Signs Last 24 Hrs  T(C): 36.6, Max: 36.6 (05-18 @ 17:08)  T(F): 97.8, Max: 97.9 (05-19 @ 01:22)  HR: 61 (52 - 66)  BP: 151/65 (127/66 - 151/65)  BP(mean): --  RR: 18 (18 - 20)  SpO2: 98% (96% - 99%)    PHYSICAL EXAM:  GENERAL: NAD, well-groomed, well-developed  HEAD:  Atraumatic, Normocephalic  EYES:  conjunctiva and sclera clear  ENMT: ; Moist mucous membranes,   NECK: Supple, No JVD, Normal thyroid  NERVOUS SYSTEM:  Alert & Oriented X2, forgetful at times  CHEST/LUNG: Clear to auscultation bilaterally; No rales, rhonchi, wheezing, or rubs  HEART: Regular rate and rhythm; No murmurs, rubs, or gallops  ABDOMEN: Soft, Nontender, Nondistended; Bowel sounds present  EXTREMITIES:  2+ Peripheral Pulses, No clubbing, cyanosis, or edema  SKIN: No rashes or lesions    LABS:                        10.4   15.0  )-----------( 231      ( 19 May 2017 06:40 )             31.5     19 May 2017 06:40    139    |  103    |  57     ----------------------------<  124    4.4     |  32     |  1.17     Ca    9.4        19 May 2017 06:40  Mg     2.7       19 May 2017 06:40          CAPILLARY BLOOD GLUCOSE      RADIOLOGY & ADDITIONAL TESTS:  Head CT: Generalized age-appropriate parenchymal volume loss without disproportionate ventriculomegaly. Mild to moderate chronic ischemic gliotic cerebral hemisphere white matter changes. Bilateral cerebellar dentate nuclei and bilateral basal ganglia calcifications. There is no edema, intra-axial or extra-axial hemorrhage or acute territorial infarct. No mass effect. Vascular calcification. Sinuses clear. No calvarial destructive lesions. Multilevel bilateral nonspecific small calcified scalp nodules.  Imaging Personally Reviewed:  [x ] YES     Consultant(s) Notes Reviewed:  Cardiology, pulm    Care Discussed with Consultants/Other Providers: Dr Cleayr, Dr De Jesus.

## 2017-05-19 NOTE — PROGRESS NOTE ADULT - PROBLEM SELECTOR PROBLEM 7
Mitral valve disease
Cardiac Pacemaker
Prophylactic measure
Mitral valve disease
Mitral valve disease
Cardiac Pacemaker

## 2017-05-19 NOTE — PROGRESS NOTE ADULT - PROBLEM SELECTOR PLAN 7
DVT proph - high risk - heparin SQ.
Severe valve disease; poor candidate for intervention at this time; plan for medical management with close outpatient follow-up.
Significant MV disease (MR). Due to age and comorbidities intervention not an option. Continue to monitor.
Significant MV disease (MR). Due to age and comorbidities intervention not an option. Continue to monitor.

## 2017-05-19 NOTE — PROGRESS NOTE ADULT - SUBJECTIVE AND OBJECTIVE BOX
PULMONARY/CRITICAL CARE      INTERVAL HPI/OVERNIGHT EVENTS: pt improved, less sob. Alert, NAD.    86y MaleHPI:  86 years old male with history CAD, Cardiomyopathy, A Fib, Anemia, pneumonia, CKD, Diverticulosis, GI bleed, BPH, COPD, chronic pain on opioids, presents with 2 day history of SOB.  Daughter reports he is mostly sedentary due to chronic knee pain despite bilateral knee replacements and chronic opioid use.  He reports feeling SOB at rest and increasing with movement but denies cough, fever, chills, chest pain, palpitations.  Aside from weak knees and chronic back pain he denies other symptoms.  He denies changes in medication or diet.  His kinesiologist has him on a low carb, no yeast diet and he has lost >100 lbs on this diet.  The patient and daughter both say that he has been eating his proteins and vegetables.  Patient and daughter [facility] but hasn't seen a cardiologist there.  Last check of his pacemaker was 3 years ago in Dr Palla's office.  He also stopped seeing Dr Cleary since "he wasn't having any breathing issues".  The daughter remembers being told he needs a PET scan, but she thought it was for something in his carotid and did not want him getting any invasive dyes since that might be bad for him.  She reports patient did not smoke but that other people smoke in the house but not in his room.  Both parents  of old age in their 90s. (16 May 2017 17:02)        PAST MEDICAL & SURGICAL HISTORY:  Carotid stenosis  GIB (gastrointestinal bleeding)  CAD (coronary artery disease)  Diverticulitis  Anemia  Polymicrobial Sepsis  Bacterial Endocarditis  Cardiac Pacemaker  COPD (Chronic Obstructive Pulmonary Disease)  Atrial Fibrillation  BPH (Benign Prostatic Hypertrophy)  Asthma, Chronic  HTN (Hypertension)  Renal Failure, Chronic  Renal Failure, Chronic  H/O total knee replacement, bilateral  Pacemaker  CAD (Coronary Artery Disease)        ICU Vital Signs Last 24 Hrs  T(C): 36.4, Max: 36.9 (05-18 @ 09:47)  T(F): 97.5, Max: 98.4 (05-18 @ 09:47)  HR: 66 (52 - 87)  BP: 138/64 (86/55 - 144/68)  BP(mean): --  ABP: --  ABP(mean): --  RR: 18 (16 - 20)  SpO2: 98% (95% - 99%)    Qtts:     I&O's Summary    I & Os for current day (as of 19 May 2017 07:36)  =============================================  IN: 360 ml / OUT: 925 ml / NET: -565 ml          REVIEW OF SYSTEMS:    CONSTITUTIONAL: No fever, weight loss, or fatigue  EYES: No eye pain, visual disturbances, or discharge  ENMT:  No difficulty hearing, tinnitus, vertigo; No sinus or throat pain  NECK: No pain or stiffness  BREASTS: No pain, masses, or nipple discharge  RESPIRATORY: No cough, wheezing, chills or hemoptysis; Minimal shortness of breath  CARDIOVASCULAR: No chest pain, palpitations, dizziness, or leg swelling  GASTROINTESTINAL: No abdominal or epigastric pain. No nausea, vomiting, or hematemesis; No diarrhea or constipation. No melena or hematochezia.  GENITOURINARY: No dysuria, frequency, hematuria, or incontinence  NEUROLOGICAL: No headaches, memory loss, loss of strength, numbness, or tremors  SKIN: No itching, burning, rashes, or lesions   LYMPH NODES: No enlarged glands  ENDOCRINE: No heat or cold intolerance; No hair loss  MUSCULOSKELETAL: No joint pain or swelling; No muscle, back, or extremity pain, no calf tenderness  PSYCHIATRIC: No depression, anxiety, mood swings, or difficulty sleeping  HEME/LYMPH: No easy bruising, or bleeding gums  ALLERGY AND IMMUNOLOGIC: No hives or eczema      PHYSICAL EXAM:    GENERAL: NAD, well-groomed, well-developed, NAD  HEAD:  Atraumatic, Normocephalic  EYES: EOMI, PERRLA, conjunctiva and sclera clear  ENMT: No tonsillar erythema, exudates, or enlargement; Moist mucous membranes, Good dentition, No lesions  NECK: Supple, No JVD, Normal thyroid  NERVOUS SYSTEM:  Alert & Oriented X3, Good concentration; Motor Strength 5/5 B/L upper and lower extremities  CHEST/LUNG: Few rhonchi, wheezing, Good excursion, no change fremitus or percussion. No rubs  HEART: Regular rate and rhythm; No murmurs, rubs, or gallops  ABDOMEN: Soft, Nontender, Nondistended; Bowel sounds present  EXTREMITIES:  2+ Peripheral Pulses, No clubbing, cyanosis, or edema  LYMPH: No lymphadenopathy noted  SKIN: No rashes or lesions        LABS:                        10.4   15.0  )-----------( 231      ( 19 May 2017 06:40 )             31.5     05-    139  |  103  |  57<H>  ----------------------------<  124<H>  4.4   |  32<H>  |  1.17    Ca    9.4      19 May 2017 06:40      PT/INR - ( 17 May 2017 11:59 )   PT: 11.5 sec;   INR: 1.05 ratio             ABG - ( 18 May 2017 06:46 )  pH: x     /  pCO2: 47    /  pO2: 93    / HCO3: 28    / Base Excess: 4.0   /  SaO2: 96                vanco through     RADIOLOGY & ADDITIONAL STUDIES: CXR unchanged  Biopsy lung positive for Adenocarcinoma      CRITICAL CARE TIME SPENT:

## 2017-05-19 NOTE — PROGRESS NOTE ADULT - PROBLEM SELECTOR PLAN 6
likely due to dietary changes and malignancy.  carbohydrate malnutrition.  d/w daughter that he needs carbs in his diet even if not bread.  encourage po intake, add ensure,
DVT proph - high risk - heparin SQ.
Severe valve disease; poor candidate for intervention at this time; plan for medical management with close outpatient follow-up.
Significant AV disease (As/AI). Due to age and comorbidities intervention not an option. Continue to monitor.
DVT proph - high risk - heparin SQ.
Significant AV disease (As/AI). Due to age and comorbidities intervention not an option. Continue to monitor.

## 2017-05-19 NOTE — PROGRESS NOTE ADULT - PROBLEM SELECTOR PLAN 1
d/w Dr Cleary, s/p CT guided biopsy.  Due to possible small apical pneumothorax will need repeat xray at 8pm tonight.   most ashley malignancy
Multifactorial.  COPD/ Probable Lung Ca./ Pulmonary hypertension/Valvular heart disease.  No CHF noted on exam or x ray. marked and worsening azotemia - hold Lasix and use PRN.
Multifactorial.  COPD/ Probable Lung Ca./ Pulmonary hypertension/Valvular heart disease. Bx. of lung mass being arranged.  No CHF noted on exam or x rays. low dose diuretic as needed. patient has mild prerenal azotemia , less likely cardiac
d/w Dr Cleary, s/p CT guided biopsy.  possible small post-procedural apical pneumothorax -repeat xrays are stable  most likley malignancy - await biopsy results.
d/w Dr Cleary, s/p CT guided biopsy.  REsults of biopsy - Adenocarcinoma .  Long discussion with patient and family with Dr De Jesus from heme/onc about indications, risks/benefits of various treatment options.
Multifactorial.  COPD/ Probable Lung Ca./ Pulmonary hypertension/Valvular heart disease. Bx. of lung mass being arranged.  No CHF noted on exam or x rays. low dose diuertic as needed.

## 2017-05-19 NOTE — PROGRESS NOTE ADULT - PROBLEM SELECTOR PLAN 4
echo done -await final report.
Functioning appropriately on Tele.  Interrogation done , reviewed , normal function , good battery status 5.5 years left , recommend OP follow up to monitor the PPM function
Normal function on interrogation - will need to resume regular outpatient monitoring.
continue aspirin   med management per cardiology    Rate controlled afib  Dig level elevated - will hold and repeat level in am
Inhalers  Steroids
continue aspirin   med management per cardiology    Rate controlled afib  Dig level elevated - will hold and repeat level in am
Functioning appropriately on Tele.
Inhalers  Steroids

## 2017-05-19 NOTE — PROGRESS NOTE ADULT - PROBLEM SELECTOR PLAN 8
Severe pulmonary HTN due to heart (valve) and lung disease; continue supplemental O2; resume digoxin if RV function decreases; lasix as needed for edema.
Severe.  greatly contributing to his sob.  likely a multifactorial etiology (COPD/LungCA/Valvular heart disease).  Can try an agent like Revatio once issue with lung Ca clarified.
Ct guided needle biopsy to be scheduled.
Severe.  greatly contributing to his sob.  likely a multifactorial etiology (COPD/LungCA/Valvular heart disease).  Can try an agent like Revatio once issue with lung Ca clarified.

## 2017-05-19 NOTE — PROGRESS NOTE ADULT - NSHPATTENDINGPLANDISCUSS_GEN_ALL_CORE
Daughter in detail
dr tavarez, son, daughter, dr reina
daughter, Dr Bass - biopsy, Dr Gupta - xray results
Dr Somers
Pt in detail
pt.

## 2017-05-19 NOTE — PROGRESS NOTE ADULT - PROBLEM SELECTOR PLAN 5
continue aspirin   med management per cardiology    Rate controlled afib  Dig level elevated - will hold and repeat level in am
AF is chronic - patient has refused anticoagulation.
Digoxin level still high and currently being paced. on aspirin currently for CVA prophylaxis as he refused anticoagulation.
likely due to dietary changes and malignancy.  carbohydrate malnutrition.  d/w daughter that he needs carbs in his diet even if not bread.  encourage po intake, add ensure,
likely due to dietary changes and malignancy.  carbohydrate malnutrition.  d/w daughter that he needs carbs in his diet even if not bread.  encourage po intake, add ensure,
Digoxin level still high and currently being paced. on aspirin currently for CVA prophylaxsis as he refused anticoagulation.

## 2017-05-19 NOTE — PROGRESS NOTE ADULT - PROBLEM SELECTOR PROBLEM 4
Pacemaker
COPD (chronic obstructive pulmonary disease) with emphysema
Nonrheumatic aortic valve stenosis
Coronary artery disease involving native coronary artery of native heart without angina pectoris
Pacemaker
Pacemaker
COPD (chronic obstructive pulmonary disease) with emphysema
Coronary artery disease involving native coronary artery of native heart without angina pectoris

## 2017-05-19 NOTE — PROGRESS NOTE ADULT - PROBLEM SELECTOR PROBLEM 3
Digoxin overdose, accidental or unintentional, initial encounter
Chronic congestive heart failure, unspecified congestive heart failure type
Coronary artery disease involving native coronary artery of native heart without angina pectoris
Chronic congestive heart failure, unspecified congestive heart failure type
Digoxin overdose, accidental or unintentional, initial encounter
Digoxin overdose, accidental or unintentional, initial encounter
Chronic congestive heart failure, unspecified congestive heart failure type
Coronary artery disease involving native coronary artery of native heart without angina pectoris

## 2017-05-19 NOTE — PROGRESS NOTE ADULT - SUBJECTIVE AND OBJECTIVE BOX
REASON FOR VISIT: Asked to see patient for SOB     HPI:  86 year old man with a history of CAD s/p PCI (LAD 2012, RCA 2010), atrial fibrillation, PPM, COPD, GI bleed (recurrent), carotid artery disease, HTN, HLD, aortic stenosis (mild in 2014), who presents to the ED with complaint of dyspnea.  Symptoms started 2 days ago - some worsening over past 48 hours prompted him to come to the ER.  He is unable to identify exacerbating or alleviating factors; no associated orthopnea, cough, hemoptysis sputum production.  He has has little traditional medical care in several years; last visit with his cardiologist (Dr. Palla) was in 2014; seeking alternative medicine.    5/17/17: Still SOB but slightly improved.  Lung mass noted on imaging and thought to be metastatic lung ca by pulmonary and BX. being planned. Denies chest pain.     5/18/2017  Patient is feeling shortness of breath ,  pulse ox around 95 % , his heart rate is controlled ,  denies any chest pain   5/19/17:  Comfortable; less SOB; no new complaints.    MEDICATIONS  (STANDING):  ALBUTerol/ipratropium for Nebulization 3milliLiter(s) Nebulizer every 6 hours  heparin  Injectable 5000Unit(s) SubCutaneous every 12 hours  aspirin enteric coated 81milliGRAM(s) Oral daily  oxyCODONE ER Tablet 40milliGRAM(s) Oral every 8 hours  doxazosin 4milliGRAM(s) Oral at bedtime  diltiazem   CD 180milliGRAM(s) Oral daily  simvastatin 40milliGRAM(s) Oral at bedtime  metoprolol 25milliGRAM(s) Oral two times a day  ferrous    sulfate 325milliGRAM(s) Oral daily  buDESOnide   0.5 milliGRAM(s) Respule 0.5milliGRAM(s) Inhalation two times a day  triamcinolone 0.1% Cream 1Application(s) Topical every 12 hours  furosemide    Tablet 20milliGRAM(s) Oral daily  predniSONE   Tablet 20milliGRAM(s) Oral two times a day  docusate sodium 100milliGRAM(s) Oral three times a day    MEDICATIONS  (PRN):  oxyCODONE IR 5milliGRAM(s) Oral every 4 hours PRN Moderate Pain (4 - 6)  oxyCODONE IR 10milliGRAM(s) Oral every 4 hours PRN Severe Pain (7 - 10)  ALBUTerol    0.083% 2.5milliGRAM(s) Nebulizer every 2 hours PRN Shortness of Breath and/or Wheezing  magnesium hydroxide Suspension 30milliLiter(s) Oral daily PRN Constipation    Vital Signs Last 24 Hrs  T(C): 36.4, Max: 36.9 (05-18 @ 09:47)  T(F): 97.5, Max: 98.4 (05-18 @ 09:47)  HR: 66 (52 - 87)  BP: 138/64 (86/55 - 144/68)  RR: 18 (16 - 20)  SpO2: 98% (95% - 99%)    PHYSICAL EXAM:  Constitutional: Seated in bed eating breakfast, appears stated age, no distress  Eyes:  EOMI,  Pupils round, No oral cyanosis.  Pulmonary: Non-labored, breath sounds and no wheezing  Cardiovascular: S1 and S2, regular rate, III/VI systolic murmur at base radiating to carotid  Gastrointestinal: Bowel Sounds present, soft, nontender.   Lymph: No peripheral edema.  Neurological: Alert  Skin: Warm, dry.    LABS:                          10.4   15.0  )-----------( 231      ( 19 May 2017 06:40 )             31.5     139  |  103  |  57<H>  ----------------------------<  124<H>  4.4   |  32<H>  |  1.17    CT Chest: Left upper lobe mass consistent with the presence of a neoplasm    ECG: AF, PVC, electronic ventricular pacing    Echo: Low normal left ventricular systolic function with an approximate ejection fraction of 50-55%.  Dilated and hypertrophied left ventricle.  Biatrial enlargement.  Severe mitral insufficiency.  Moderate aortic stenosis with moderate to severe aortic insufficiency.  Severe tricuspid insufficiency with severe pulmonary hypertension.  Mild to moderate diastolic dysfunction.  Trace to mild pericardial effusion.    Monitor:  Atrial fibrillation with demand PPM

## 2017-05-19 NOTE — PROGRESS NOTE ADULT - PROBLEM SELECTOR PLAN 3
Cardiology f/u appreciated  Med management per cardiology  Lasix prn due to azotemia.   dc digoxin as per cardiology.

## 2017-05-19 NOTE — PROGRESS NOTE ADULT - PROBLEM SELECTOR PLAN 3
Elevated digoxin level on presentation; HR is controlled and LV function is normal; I do not favor resuming digoxin.

## 2017-05-19 NOTE — PROGRESS NOTE ADULT - ASSESSMENT
86M with multiple chronic medical issues presents with worsening dyspnea, no clear alleviating or aggravating factors other than worse with ambulation.  BNP elevated but unclear if symptoms are due to CHF.  Symptoms may be due to untreated COPD or lung mass found on CT.
Elderly noncompliant pt. 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   COPD with recent exacerbation.
Elderly noncompliant pt. with MARILIA mass--most likely metastatic lung cancer.  COPD with recent exacerbation.

## 2017-05-19 NOTE — PROGRESS NOTE ADULT - PROBLEM SELECTOR PROBLEM 2
Coronary artery disease involving native coronary artery of native heart without angina pectoris
COPD (chronic obstructive pulmonary disease) with emphysema
COPD (chronic obstructive pulmonary disease) with emphysema
Coronary artery disease involving native coronary artery of native heart without angina pectoris
Coronary artery disease involving native coronary artery of native heart without angina pectoris
COPD (chronic obstructive pulmonary disease) with emphysema
SOB (shortness of breath)
SOB (shortness of breath)

## 2017-05-19 NOTE — CONSULT NOTE ADULT - SUBJECTIVE AND OBJECTIVE BOX
Hematology/Oncology consult     Patient is seen and examined  notes/labs reviewed  pt family--daughter/son are at bedside and d/w family.  consult dictated,  Job # [id number]    pt with lung mass/nodule for over 3 yrs atleast, pt  followed in past with pul--dr reina, pt cancelled pet scan requested by pulmonary dr reina on 3/31/2014, and pt/family has decided against any further w/u/pet scan since 2014, pt was admited 5/2017 with sob--had ct bx--adenoca,  pt was last seen by me 3/31/2014-per dtr refused ct a/p in 3/2014 and never went for suggested pet scan as well,  pt has not followed with pul/cardiology for past 3 yrs per pt/family,  and followed at [facility] only with pcp and doctor for movement (non tradiotional doctor)--?kaneilogist      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, ct chest--locally advanced/mediastinal ln  no mets w/u(ct a/p, bone scan)  pt is for planned d/c today to rehab and daughter declines ct surgery eval/refuses mets w/u while here with further radio scan (and cancel the discharge)  she states she is not even considering chemotherapy  r;b ratio of diff modalities---surgery/chemo-xrt, chemo discussed and at present dtr refuses chemo/surgery/ct surgery eval and wants to be discharged and prefer further w/u as out pt (if pt/family ) decides  can be a candidate of local xrt , if has resp complaints and dtr was informed--r;b ratio  dtr/son  undecided at this point    pt will need pet/ct scan--mets w/u, medical oncology f/u, rad oncology evaluation and an opinion from ct surgery (if mets w/u neg)--dtr/son/pt prefer as out pt  given my office number--dtr/pt states will make an appt with me/ or [facility]  [facility] [facility] [place] [address]  [place]--[phone] given to family    RECOMMENDATION:  pt will need pet/ct scan--mets w/u, medical oncology f/u, rad oncology evaluation and an opinion from ct surgery (if mets w/u neg)--dtr/son/pt prefer as out pt  given my office number--dtr/pt states will make an appt with me/ or [facility]  my office add---[address] [facility] [address]  ph--[phone] given to family    Dr velez ,thanks for courtsey of this consult,will follow this pt with you for hem/onc issue while pt is in hospital. Hematology/Oncology consult     Patient is seen and examined, pt is on tele/cardiac bed at [facility]  notes/labs reviewed  pt family--daughter/son are at bedside and d/w family.  consult dictated,  Job # [id number]    pt with lung mass/nodule for over 3 yrs atleast, pt  followed in past with pul--dr reina, pt cancelled pet scan requested by pulmonary dr reina on 3/31/2014, and pt/family has decided against any further w/u/pet scan since 2014, pt was admited 5/2017 with sob--had ct bx--adenoca,  pt was last seen by me 3/31/2014-per dtr refused ct a/p in 3/2014 and never went for suggested pet scan as well,  pt has not followed with pul/cardiology for past 3 yrs per pt/family,  and followed at [facility] only with pcp and doctor for movement (non tradiotional doctor)--?kaneilogist. pt is on monitor bed, on n/c o2 and on monitor hr is at 73/min this time.      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, ct chest--locally advanced/mediastinal ln  no mets w/u(ct a/p, bone scan)  pt is for planned d/c today to rehab and daughter declines ct surgery eval/refuses mets w/u while here with further radio scan (and cancel the discharge)  she states she is not even considering chemotherapy  r;b ratio of diff modalities---surgery/chemo-xrt, chemo discussed and at present dtr refuses chemo/surgery/ct surgery eval and wants to be discharged and prefer further w/u as out pt (if pt/family ) decides  can be a candidate of local xrt , if has resp complaints and dtr was informed--r;b ratio  dtr/son  undecided at this point    pt will need pet/ct scan--mets w/u, medical oncology f/u, rad oncology evaluation and an opinion from ct surgery (if mets w/u neg)--dtr/son/pt prefer as out pt  given my office number--dtr/pt states will make an appt with me/ or [facility]  my office add---[address] [facility] [address]  ph--[phone] given to family    RECOMMENDATION:  pt will need pet/ct scan--mets w/u, medical oncology f/u, rad oncology evaluation and an opinion from ct surgery (if mets w/u neg)--dtr/son/pt prefer as out pt  given my office number--dtr/pt states will make an appt with me/ or [facility]  my office add---[facility], [facility] [address]  ph--[phone] given to family    Dr velez ,thanks for courtsey of this consult,will follow this pt with you for hem/onc issue while pt is in hospital.

## 2017-05-19 NOTE — PROGRESS NOTE ADULT - PROBLEM SELECTOR PROBLEM 5
Atrial fibrillation, unspecified type
Carotid stenosis
Coronary artery disease involving native coronary artery of native heart without angina pectoris
Atrial fibrillation, unspecified type
Atrial fibrillation, unspecified type
Cachexia
Cachexia
Carotid stenosis

## 2017-05-31 ENCOUNTER — APPOINTMENT (OUTPATIENT)
Dept: NUCLEAR MEDICINE | Facility: CLINIC | Age: 82
End: 2017-05-31

## 2020-03-11 NOTE — ED PROVIDER NOTE - CARDIAC, MLM
PATIENT INSTRUCTIONS    Treatment:    Continue daily dry dressing changes    Apply iodine to incision with dressing changes    Follow-Up:  Please make an appointment with  Christa Garcia in 2 weeks          Please note: 24 hour notice for cancellation of appointment is required.    You may receive a survey in the mail, or via the e-mail address that you have provided.  We would appreciate if you could fill out the survey and provide us with any feedback on your experience regarding your visit today. Thank you for allowing us to provide you with your health care needs.     Do not hesitate to call if you are experiencing severe pain, worsening or change in your pain, have symptoms of infection (fever, warmth, redness, increased drainage), or have any other problem that concerns you ~ [phone] (or [phone] after hours).    Please remember when requesting refills on pain medication that the request should be made by Thursday at the latest. [facility] is open Monday-Friday, 8am-5pm, and closed on the weekends.  No narcotic refills will be filled after hours.    Additional Educational Resources:  For additional resources regarding your symptoms, diagnosis, or further health information, please visit the Health Resources section on [email or web address] or the Online Health Resources section in [email or web address]        
Normal rate, regular rhythm.  Heart sounds S1, S2.  No murmurs, rubs or gallops. no edema to lower extremities

## 2021-02-16 NOTE — ED ADULT NURSE NOTE - NEURO WDL
Symptoms do not sound consistent with sciatica.  I suspect patient is seeing his vertebral processes.  Would recommend office evaluation to discuss further.   Alert and oriented to person, place and time, memory intact, behavior appropriate to situation, PERRL.

## 2024-01-03 NOTE — ED PROVIDER NOTE - OBJECTIVE STATEMENT
Addended by: MAE MARTINEZ on: 1/3/2024 09:28 AM     Modules accepted: Orders    
85 y/o M pt with hx of asthma, COPD, A-Fib, CAD, Pacemaker, HTN, 3 cardiac stents, renal failure  presents to ED c/o worsening SOB and weakness for 3 days. He is not O2 dependent at home.  Denies fevers, chest pain, cough, abdominal pain, nausea, vomiting. No further complaints at this time.